# Patient Record
Sex: FEMALE | Race: WHITE | NOT HISPANIC OR LATINO | Employment: OTHER | URBAN - NONMETROPOLITAN AREA
[De-identification: names, ages, dates, MRNs, and addresses within clinical notes are randomized per-mention and may not be internally consistent; named-entity substitution may affect disease eponyms.]

---

## 2024-01-31 ENCOUNTER — APPOINTMENT (OUTPATIENT)
Dept: PRIMARY CARE | Facility: CLINIC | Age: 59
End: 2024-01-31
Payer: COMMERCIAL

## 2024-03-19 ENCOUNTER — APPOINTMENT (OUTPATIENT)
Dept: PRIMARY CARE | Facility: CLINIC | Age: 59
End: 2024-03-19
Payer: COMMERCIAL

## 2024-04-29 ENCOUNTER — TELEPHONE (OUTPATIENT)
Dept: PRIMARY CARE | Facility: CLINIC | Age: 59
End: 2024-04-29

## 2024-04-29 DIAGNOSIS — Z12.31 SCREENING MAMMOGRAM FOR BREAST CANCER: Primary | ICD-10-CM

## 2024-04-30 ENCOUNTER — APPOINTMENT (OUTPATIENT)
Dept: PRIMARY CARE | Facility: CLINIC | Age: 59
End: 2024-04-30
Payer: COMMERCIAL

## 2024-05-07 ENCOUNTER — HOSPITAL ENCOUNTER (OUTPATIENT)
Dept: RADIOLOGY | Facility: CLINIC | Age: 59
Discharge: HOME | End: 2024-05-07
Payer: COMMERCIAL

## 2024-05-07 VITALS — HEIGHT: 64 IN | WEIGHT: 146 LBS | BODY MASS INDEX: 24.92 KG/M2

## 2024-05-07 DIAGNOSIS — Z12.31 SCREENING MAMMOGRAM FOR BREAST CANCER: ICD-10-CM

## 2024-05-07 PROCEDURE — 77063 BREAST TOMOSYNTHESIS BI: CPT | Performed by: STUDENT IN AN ORGANIZED HEALTH CARE EDUCATION/TRAINING PROGRAM

## 2024-05-07 PROCEDURE — 77067 SCR MAMMO BI INCL CAD: CPT | Performed by: STUDENT IN AN ORGANIZED HEALTH CARE EDUCATION/TRAINING PROGRAM

## 2024-05-07 PROCEDURE — 77067 SCR MAMMO BI INCL CAD: CPT

## 2024-05-08 ENCOUNTER — APPOINTMENT (OUTPATIENT)
Dept: PRIMARY CARE | Facility: CLINIC | Age: 59
End: 2024-05-08
Payer: COMMERCIAL

## 2024-05-31 ENCOUNTER — APPOINTMENT (OUTPATIENT)
Dept: PRIMARY CARE | Facility: CLINIC | Age: 59
End: 2024-05-31
Payer: COMMERCIAL

## 2024-06-05 ENCOUNTER — HOSPITAL ENCOUNTER (OUTPATIENT)
Dept: RADIOLOGY | Facility: CLINIC | Age: 59
Discharge: HOME | End: 2024-06-05
Payer: COMMERCIAL

## 2024-06-05 DIAGNOSIS — M54.50 LOW BACK PAIN: ICD-10-CM

## 2024-06-05 PROCEDURE — 72040 X-RAY EXAM NECK SPINE 2-3 VW: CPT | Performed by: RADIOLOGY

## 2024-06-05 PROCEDURE — 72100 X-RAY EXAM L-S SPINE 2/3 VWS: CPT

## 2024-06-05 PROCEDURE — 72100 X-RAY EXAM L-S SPINE 2/3 VWS: CPT | Performed by: RADIOLOGY

## 2024-06-05 PROCEDURE — 72040 X-RAY EXAM NECK SPINE 2-3 VW: CPT

## 2024-06-14 ENCOUNTER — APPOINTMENT (OUTPATIENT)
Dept: PRIMARY CARE | Facility: CLINIC | Age: 59
End: 2024-06-14
Payer: COMMERCIAL

## 2024-08-20 ENCOUNTER — LAB (OUTPATIENT)
Dept: LAB | Facility: LAB | Age: 59
End: 2024-08-20
Payer: COMMERCIAL

## 2024-08-20 ENCOUNTER — APPOINTMENT (OUTPATIENT)
Dept: PRIMARY CARE | Facility: CLINIC | Age: 59
End: 2024-08-20
Payer: COMMERCIAL

## 2024-08-20 VITALS
HEART RATE: 70 BPM | BODY MASS INDEX: 25.51 KG/M2 | TEMPERATURE: 98.6 F | DIASTOLIC BLOOD PRESSURE: 65 MMHG | HEIGHT: 64 IN | WEIGHT: 149.4 LBS | SYSTOLIC BLOOD PRESSURE: 103 MMHG | RESPIRATION RATE: 12 BRPM | OXYGEN SATURATION: 97 %

## 2024-08-20 DIAGNOSIS — Z00.00 HEALTHCARE MAINTENANCE: ICD-10-CM

## 2024-08-20 DIAGNOSIS — R53.83 FATIGUE, UNSPECIFIED TYPE: ICD-10-CM

## 2024-08-20 DIAGNOSIS — R79.89 LOW VITAMIN D LEVEL: ICD-10-CM

## 2024-08-20 DIAGNOSIS — M54.50 LOW BACK PAIN WITHOUT SCIATICA, UNSPECIFIED BACK PAIN LATERALITY, UNSPECIFIED CHRONICITY: ICD-10-CM

## 2024-08-20 DIAGNOSIS — D72.819 LEUKOPENIA, UNSPECIFIED TYPE: ICD-10-CM

## 2024-08-20 DIAGNOSIS — E66.3 OVERWEIGHT WITH BODY MASS INDEX (BMI) OF 26 TO 26.9 IN ADULT: ICD-10-CM

## 2024-08-20 DIAGNOSIS — Z12.11 COLON CANCER SCREENING: ICD-10-CM

## 2024-08-20 DIAGNOSIS — Z00.00 HEALTHCARE MAINTENANCE: Primary | ICD-10-CM

## 2024-08-20 PROBLEM — M67.919 DISORDER OF ROTATOR CUFF: Status: RESOLVED | Noted: 2024-08-20 | Resolved: 2024-08-20

## 2024-08-20 PROBLEM — J38.01 VOCAL CORD PARALYSIS, UNILATERAL PARTIAL: Status: ACTIVE | Noted: 2024-08-20

## 2024-08-20 PROBLEM — M67.919 DISORDER OF ROTATOR CUFF: Status: ACTIVE | Noted: 2024-08-20

## 2024-08-20 PROBLEM — N83.202 OVARIAN CYST, LEFT: Status: ACTIVE | Noted: 2024-08-20

## 2024-08-20 PROCEDURE — 3008F BODY MASS INDEX DOCD: CPT | Performed by: FAMILY MEDICINE

## 2024-08-20 PROCEDURE — 80053 COMPREHEN METABOLIC PANEL: CPT

## 2024-08-20 PROCEDURE — 99203 OFFICE O/P NEW LOW 30 MIN: CPT | Performed by: FAMILY MEDICINE

## 2024-08-20 PROCEDURE — 84443 ASSAY THYROID STIM HORMONE: CPT

## 2024-08-20 PROCEDURE — 36415 COLL VENOUS BLD VENIPUNCTURE: CPT

## 2024-08-20 PROCEDURE — 82306 VITAMIN D 25 HYDROXY: CPT

## 2024-08-20 PROCEDURE — 1036F TOBACCO NON-USER: CPT | Performed by: FAMILY MEDICINE

## 2024-08-20 PROCEDURE — 80061 LIPID PANEL: CPT

## 2024-08-20 PROCEDURE — 85025 COMPLETE CBC W/AUTO DIFF WBC: CPT

## 2024-08-20 PROCEDURE — 99386 PREV VISIT NEW AGE 40-64: CPT | Performed by: FAMILY MEDICINE

## 2024-08-20 ASSESSMENT — PROMIS GLOBAL HEALTH SCALE
RATE_PHYSICAL_HEALTH: VERY GOOD
RATE_QUALITY_OF_LIFE: VERY GOOD
EMOTIONAL_PROBLEMS: RARELY
RATE_SOCIAL_SATISFACTION: VERY GOOD
CARRYOUT_SOCIAL_ACTIVITIES: VERY GOOD
CARRYOUT_PHYSICAL_ACTIVITIES: COMPLETELY
RATE_GENERAL_HEALTH: VERY GOOD
RATE_MENTAL_HEALTH: VERY GOOD
RATE_AVERAGE_PAIN: 1

## 2024-08-20 ASSESSMENT — PATIENT HEALTH QUESTIONNAIRE - PHQ9
SUM OF ALL RESPONSES TO PHQ9 QUESTIONS 1 AND 2: 0
1. LITTLE INTEREST OR PLEASURE IN DOING THINGS: NOT AT ALL
2. FEELING DOWN, DEPRESSED OR HOPELESS: NOT AT ALL

## 2024-08-20 NOTE — PATIENT INSTRUCTIONS
Leukopenia  Mild, no on prior CBC, repeat CBC w/ diff ordered today.    Healthcare maintenance  Vaccines and screenings reviewed.  Questionnaires completed.  Health and wellness topics reviewed.  Diet and exercise recommendations revisited.  Routine blood work ordered today.    VACCINES:  -TDAP is due, can get at pharmacy, no rx needed.  -Shingles vaccine (Shingrix) is recommended. Please call insurance to see if covered. This vaccine is expensive but extremely effective. This is to be administered in 2 separate doses, 2- 6 months apart. This is a killed virus vaccine, so no risk of chicken pox or shingles with administration. The vaccine is approximately 90 % effective to protect against shingles even 5 years out. Side effects of the vaccine can include soreness of the arm at administration site and possible flu-like symptoms after administration. This is a strongly recommended vaccine.     SCREENINGS:  -Screening pap is due, patient opts to come back to PCP for pap only visit  -Screening mammo is utd, last completed 5/2024, repeat in 1 year  -Screening for colon cancer is due. The 3 Colon Cancer screening tests were reviewed with patient, FIT Test, Colonoscopy and Cologuard. Risks and benefits of each test were discussed. Patient elects to undergo Cologuard, ordered today.    LIFESTYLE MEASURES  -consider increasing protein intake provided no issues with kidneys to 1 gram per 1 pound of ideal body weight per not to exceed 150 gram per day. May have to supplement with a protein powder to achieve this goal.  -make sure you are avoiding refined carbs such as breads, pasta, cereal, candy, soda,  nutrition bars, granola, chips, and sugar sweetened beverages.      -eat 5- 7 servings daily of veggies,  healthy protein such as chicken, fish,  beans, and eggs, and include healthy fats in your diet such as seeds, nuts, olive oil, avocados, and salmon.   -exercise 4 - 6 days per week as you are able, 150 minutes total weekly  divided up is recommended with 3-4 of those days including resistance/strength training.  -Vitamin D is recommended at 1000 - 5000 IU international units daily.   -Always wear sunscreen when you have sun exposure.  -64 oz of water is recommended daily.  -Dental visits recommended every 6 months.  -Eye exam recommended every 2 years, for those with vision problems every year.      Low vitamin D level  Noted on prior labs, repeat vitamin D level ordered today.    Low back pain without sciatica  S/p chiropractor, prior imaging reviewed, did show mild spondylosis.  Exam reassuring in office, no further imaging indicated at this time.    I recommend physical therapy. Order has been placed.     Doing low back and core muscle strengthening exercises and continuing them lifelong can decrease your risk of re-injury, as well as help with acute symptoms.      Patient may use heat or cold, whichever provides greater relief.    Patient can use ibuprofen or Aleve as instructed on the bottle, unless unable to take (allergy, recent bleeding in stomach, decreased kidney function)  . Make sure you take those after food, risk of stomach upset, GI bleeding, etc. increased if taken on an empty stomach.    Know that there is an extremely small but real risk of heart or stroke with use of these medicines, particularly if a person has cardiac risk factors such as high blood pressure, cholesterol, diabetes.      Tylenol can safely be used at doses up to 500 mg 2 tabs 3 times daily, unless liver function is decreased.      Recommend tiger balm over-the-counter as a topical soothing agent.      Biofreeze or other menthol based products can also be tried.      Try Epsom salt soaks to see if this improves pain and muscle tightness.      Follow up for pap only visit

## 2024-08-20 NOTE — ASSESSMENT & PLAN NOTE
Vaccines and screenings reviewed.  Questionnaires completed.  Health and wellness topics reviewed.  Diet and exercise recommendations revisited.  Routine blood work ordered today.    VACCINES:  -TDAP is due, can get at pharmacy, no rx needed.  -Shingles vaccine (Shingrix) is recommended. Please call insurance to see if covered. This vaccine is expensive but extremely effective. This is to be administered in 2 separate doses, 2- 6 months apart. This is a killed virus vaccine, so no risk of chicken pox or shingles with administration. The vaccine is approximately 90 % effective to protect against shingles even 5 years out. Side effects of the vaccine can include soreness of the arm at administration site and possible flu-like symptoms after administration. This is a strongly recommended vaccine.     SCREENINGS:  -Screening pap is due, patient opts to come back to PCP for pap only visit  -Screening mammo is utd, last completed 5/2024, repeat in 1 year  -Screening for colon cancer is due. The 3 Colon Cancer screening tests were reviewed with patient, FIT Test, Colonoscopy and Cologuard. Risks and benefits of each test were discussed. Patient elects to undergo Cologuard, ordered today.    LIFESTYLE MEASURES  -consider increasing protein intake provided no issues with kidneys to 1 gram per 1 pound of ideal body weight per not to exceed 150 gram per day. May have to supplement with a protein powder to achieve this goal.  -make sure you are avoiding refined carbs such as breads, pasta, cereal, candy, soda,  nutrition bars, granola, chips, and sugar sweetened beverages.      -eat 5- 7 servings daily of veggies,  healthy protein such as chicken, fish,  beans, and eggs, and include healthy fats in your diet such as seeds, nuts, olive oil, avocados, and salmon.   -exercise 4 - 6 days per week as you are able, 150 minutes total weekly divided up is recommended with 3-4 of those days including resistance/strength  training.  -Vitamin D is recommended at 1000 - 5000 IU international units daily.   -Always wear sunscreen when you have sun exposure.  -64 oz of water is recommended daily.  -Dental visits recommended every 6 months.  -Eye exam recommended every 2 years, for those with vision problems every year.

## 2024-08-20 NOTE — ASSESSMENT & PLAN NOTE
S/p chiropractor, prior imaging reviewed, did show mild spondylosis.  Exam reassuring in office, no further imaging indicated at this time.    I recommend physical therapy. Order has been placed.     Doing low back and core muscle strengthening exercises and continuing them lifelong can decrease your risk of re-injury, as well as help with acute symptoms.      Patient may use heat or cold, whichever provides greater relief.    Patient can use ibuprofen or Aleve as instructed on the bottle, unless unable to take (allergy, recent bleeding in stomach, decreased kidney function)  . Make sure you take those after food, risk of stomach upset, GI bleeding, etc. increased if taken on an empty stomach.    Know that there is an extremely small but real risk of heart or stroke with use of these medicines, particularly if a person has cardiac risk factors such as high blood pressure, cholesterol, diabetes.      Tylenol can safely be used at doses up to 500 mg 2 tabs 3 times daily, unless liver function is decreased.      Recommend tiger balm over-the-counter as a topical soothing agent.      Biofreeze or other menthol based products can also be tried.      Try Epsom salt soaks to see if this improves pain and muscle tightness.

## 2024-08-20 NOTE — PROGRESS NOTES
Subjective   Patient ID: Mable Daniel is a 59 y.o. female who presents for Annual Exam.    HPI     Patient presents today for annual physical.    Patient is previously known to this office,   Last saw Dr. Vazquez in 1/2020. Considered new patient since last visit greater than 3 years ago.    Patient concerns:  # Back pain/SI joint pain  Ongoing since May 2024  Saw chiropractor who performed x-rays, felt that hips were unaligned from possible prior fall and wonders if this was contributing to the back pain.    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 She is doing some stretching independently, not so much since recent flare. Has not undergone any PT.    6/2024 x-ray lumbar spine: multilevel spondylosis facet disease worse in the lower lumbar spine, minimal anterolisthesis L4 on L5    6/2024 x-ray cervical spine: mild spondylosis at C5-C6. No acute abnormality     No red flag symtpoms -  no unexplained weight loss, no neurologic symptoms      Trouble losing weight around the middle -  already lifting 4 - 5 days per week eating well,,  cycling macros - still unable,   I offered to set up appt to discuss medical assited wt loss but patient declined      Does have a bit of fatigue,    no family history of thyroid disease     History of vitamin D deficiency       WELLNESS VISIT   TDAP: none found  SHINGRIX: none found  PNEUMOVAX: n/a  PAP: none found  MAMMO: 5/2024  CSCOPE: 1/2020 cologuard negative - DUE  DEXA: n/a  HEP C SCREEN: none found  CACS: none found  LIPID: [id number]    Patient defers Shingrix vaccine.  Patient defers TDAP vaccine.    Diet/Exercise: states starting back on consistent program in May 2024, down 12-15 lbs with this effort. Continues with good efforts but still not seeing weight loss trends she would expect given the amount of effort.    Alcohol use: none  Smoking: never  "smoker    Cervical cancer screening: due  Denies family history in first degree relative.  Denies pelvic pain, vaginal discharge, or vaginal bleeding.    Breast cancer screening: utd  Denies family history in first degree relative.  Denies lumps/bumps, skin changes, nipple retraction, or nipple drainage.    Colon cancer screening: due?  Denies family history in first degree relative.  Denies melena, hematochezia, constipation, diarrhea, bloating, change in bowel habits.      Cardiac disorder screening:   Denies family history in first degree relative.  Denies chest pain, SOB, palpitations, edema, dizziness.       Review of Systems   All other systems reviewed and are negative.      Objective   /65 (BP Location: Left arm, Patient Position: Sitting, BP Cuff Size: Adult)   Pulse 70   Temp 37 °C (98.6 °F)   Resp 12   Ht 1.613 m (5' 3.5\")   Wt 67.8 kg (149 lb 6.4 oz)   SpO2 97%   BMI 26.05 kg/m²     Physical Exam  Vitals and nursing note reviewed.   Constitutional:       General: She is not in acute distress.     Appearance: Normal appearance. She is not toxic-appearing.   HENT:      Head: Normocephalic and atraumatic.   Eyes:      Extraocular Movements: Extraocular movements intact.      Pupils: Pupils are equal, round, and reactive to light.   Neck:      Thyroid: No thyromegaly.      Vascular: No hepatojugular reflux or JVD.   Cardiovascular:      Rate and Rhythm: Normal rate and regular rhythm.      Heart sounds: No murmur heard.     No friction rub. No gallop.   Pulmonary:      Effort: Pulmonary effort is normal.      Breath sounds: Normal breath sounds. No wheezing, rhonchi or rales.   Abdominal:      General: Bowel sounds are normal. There is no distension.      Palpations: Abdomen is soft. There is no mass.      Tenderness: There is no abdominal tenderness. There is no guarding.   Musculoskeletal:      Right lower leg: No edema.      Left lower leg: No edema.      Comments: Mildly ttp paraspinal " muscles, R>L  SI height discrepancy, superior on left, inferior on right   Lymphadenopathy:      Cervical: No cervical adenopathy.   Skin:     General: Skin is warm and dry.   Neurological:      General: No focal deficit present.      Mental Status: She is alert and oriented to person, place, and time.      Gait: Gait normal.   Psychiatric:         Mood and Affect: Mood normal.         Behavior: Behavior normal.         Assessment/Plan   Problem List Items Addressed This Visit             ICD-10-CM    Healthcare maintenance - Primary Z00.00     Vaccines and screenings reviewed.  Questionnaires completed.  Health and wellness topics reviewed.  Diet and exercise recommendations revisited.  Routine blood work ordered today.    VACCINES:  -TDAP is due, can get at pharmacy, no rx needed.  -Shingles vaccine (Shingrix) is recommended. Please call insurance to see if covered. This vaccine is expensive but extremely effective. This is to be administered in 2 separate doses, 2- 6 months apart. This is a killed virus vaccine, so no risk of chicken pox or shingles with administration. The vaccine is approximately 90 % effective to protect against shingles even 5 years out. Side effects of the vaccine can include soreness of the arm at administration site and possible flu-like symptoms after administration. This is a strongly recommended vaccine.     SCREENINGS:  -Screening pap is due, patient opts to come back to PCP for pap only visit  -Screening mammo is utd, last completed 5/2024, repeat in 1 year  -Screening for colon cancer is due. The 3 Colon Cancer screening tests were reviewed with patient, FIT Test, Colonoscopy and Cologuard. Risks and benefits of each test were discussed. Patient elects to undergo Cologuard, ordered today.    LIFESTYLE MEASURES  -consider increasing protein intake provided no issues with kidneys to 1 gram per 1 pound of ideal body weight per not to exceed 150 gram per day. May have to supplement with  a protein powder to achieve this goal.  -make sure you are avoiding refined carbs such as breads, pasta, cereal, candy, soda,  nutrition bars, granola, chips, and sugar sweetened beverages.      -eat 5- 7 servings daily of veggies,  healthy protein such as chicken, fish,  beans, and eggs, and include healthy fats in your diet such as seeds, nuts, olive oil, avocados, and salmon.   -exercise 4 - 6 days per week as you are able, 150 minutes total weekly divided up is recommended with 3-4 of those days including resistance/strength training.  -Vitamin D is recommended at 1000 - 5000 IU international units daily.   -Always wear sunscreen when you have sun exposure.  -64 oz of water is recommended daily.  -Dental visits recommended every 6 months.  -Eye exam recommended every 2 years, for those with vision problems every year.           Relevant Orders    Comprehensive Metabolic Panel    CBC and Auto Differential    Lipid Panel    Leukopenia D72.819     Mild, no on prior CBC, repeat CBC w/ diff ordered today.         Low vitamin D level R79.89     Noted on prior labs, repeat vitamin D level ordered today.         Relevant Orders    Vitamin D 25-Hydroxy,Total (for eval of Vitamin D levels)    Low back pain without sciatica M54.50     S/p chiropractor, prior imaging reviewed, did show mild spondylosis.  Exam reassuring in office, no further imaging indicated at this time.    I recommend physical therapy. Order has been placed.     Doing low back and core muscle strengthening exercises and continuing them lifelong can decrease your risk of re-injury, as well as help with acute symptoms.      Patient may use heat or cold, whichever provides greater relief.    Patient can use ibuprofen or Aleve as instructed on the bottle, unless unable to take (allergy, recent bleeding in stomach, decreased kidney function)  . Make sure you take those after food, risk of stomach upset, GI bleeding, etc. increased if taken on an empty  stomach.    Know that there is an extremely small but real risk of heart or stroke with use of these medicines, particularly if a person has cardiac risk factors such as high blood pressure, cholesterol, diabetes.      Tylenol can safely be used at doses up to 500 mg 2 tabs 3 times daily, unless liver function is decreased.      Recommend tiger balm over-the-counter as a topical soothing agent.      Biofreeze or other menthol based products can also be tried.      Try Epsom salt soaks to see if this improves pain and muscle tightness.          Relevant Orders    Referral to Physical Therapy     Other Visit Diagnoses         Codes    Overweight with body mass index (BMI) of 26 to 26.9 in adult     E66.3, Z68.26    Colon cancer screening     Z12.11    Relevant Orders    Cologuard® colon cancer screening    Fatigue, unspecified type     R53.83    Relevant Orders    TSH with reflex to Free T4 if abnormal            Follow-up in earliest convenience for pap only visit.  Call for sooner follow-up if needed.         Scribe Attestation  By signing my name below, ICamila Scribe   attest that this documentation has been prepared under the direction and in the presence of Shana Villarreal DO.

## 2024-08-21 LAB
25(OH)D3 SERPL-MCNC: 35 NG/ML (ref 30–100)
ALBUMIN SERPL BCP-MCNC: 4.6 G/DL (ref 3.4–5)
ALP SERPL-CCNC: 75 U/L (ref 33–110)
ALT SERPL W P-5'-P-CCNC: 20 U/L (ref 7–45)
ANION GAP SERPL CALC-SCNC: 15 MMOL/L (ref 10–20)
AST SERPL W P-5'-P-CCNC: 24 U/L (ref 9–39)
BASOPHILS # BLD AUTO: 0.02 X10*3/UL (ref 0–0.1)
BASOPHILS NFR BLD AUTO: 0.7 %
BILIRUB SERPL-MCNC: 0.4 MG/DL (ref 0–1.2)
BUN SERPL-MCNC: 17 MG/DL (ref 6–23)
CALCIUM SERPL-MCNC: 9.6 MG/DL (ref 8.6–10.6)
CHLORIDE SERPL-SCNC: 101 MMOL/L (ref 98–107)
CHOLEST SERPL-MCNC: 240 MG/DL (ref 0–199)
CHOLESTEROL/HDL RATIO: 2.8
CO2 SERPL-SCNC: 30 MMOL/L (ref 21–32)
CREAT SERPL-MCNC: 0.72 MG/DL (ref 0.5–1.05)
EGFRCR SERPLBLD CKD-EPI 2021: >90 ML/MIN/1.73M*2
EOSINOPHIL # BLD AUTO: 0.02 X10*3/UL (ref 0–0.7)
EOSINOPHIL NFR BLD AUTO: 0.7 %
ERYTHROCYTE [DISTWIDTH] IN BLOOD BY AUTOMATED COUNT: 13.1 % (ref 11.5–14.5)
GLUCOSE SERPL-MCNC: 86 MG/DL (ref 74–99)
HCT VFR BLD AUTO: 41.8 % (ref 36–46)
HDLC SERPL-MCNC: 86.1 MG/DL
HGB BLD-MCNC: 13.4 G/DL (ref 12–16)
IMM GRANULOCYTES # BLD AUTO: 0.01 X10*3/UL (ref 0–0.7)
IMM GRANULOCYTES NFR BLD AUTO: 0.3 % (ref 0–0.9)
LDLC SERPL CALC-MCNC: 136 MG/DL
LYMPHOCYTES # BLD AUTO: 0.87 X10*3/UL (ref 1.2–4.8)
LYMPHOCYTES NFR BLD AUTO: 29.6 %
MCH RBC QN AUTO: 28 PG (ref 26–34)
MCHC RBC AUTO-ENTMCNC: 32.1 G/DL (ref 32–36)
MCV RBC AUTO: 87 FL (ref 80–100)
MONOCYTES # BLD AUTO: 0.23 X10*3/UL (ref 0.1–1)
MONOCYTES NFR BLD AUTO: 7.8 %
NEUTROPHILS # BLD AUTO: 1.79 X10*3/UL (ref 1.2–7.7)
NEUTROPHILS NFR BLD AUTO: 60.9 %
NON HDL CHOLESTEROL: 154 MG/DL (ref 0–149)
NRBC BLD-RTO: 0 /100 WBCS (ref 0–0)
PLATELET # BLD AUTO: 195 X10*3/UL (ref 150–450)
POTASSIUM SERPL-SCNC: 4.6 MMOL/L (ref 3.5–5.3)
PROT SERPL-MCNC: 7.2 G/DL (ref 6.4–8.2)
RBC # BLD AUTO: 4.78 X10*6/UL (ref 4–5.2)
SODIUM SERPL-SCNC: 141 MMOL/L (ref 136–145)
TRIGL SERPL-MCNC: 88 MG/DL (ref 0–149)
TSH SERPL-ACNC: 2.38 MIU/L (ref 0.44–3.98)
VLDL: 18 MG/DL (ref 0–40)
WBC # BLD AUTO: 2.9 X10*3/UL (ref 4.4–11.3)

## 2024-08-26 DIAGNOSIS — D72.819 LEUKOPENIA, UNSPECIFIED TYPE: Primary | ICD-10-CM

## 2024-08-28 ENCOUNTER — APPOINTMENT (OUTPATIENT)
Dept: PHYSICAL THERAPY | Facility: CLINIC | Age: 59
End: 2024-08-28
Payer: COMMERCIAL

## 2024-09-11 ENCOUNTER — PATIENT OUTREACH (OUTPATIENT)
Dept: HEMATOLOGY/ONCOLOGY | Facility: HOSPITAL | Age: 59
End: 2024-09-11
Payer: COMMERCIAL

## 2024-09-11 NOTE — PROGRESS NOTES
9/11/24 1420  Received referral for this patient from her PCP. Patient was found to have leukocytosis with WBC 3.1 three years ago and 2.9 this year. Patient is referred to [facility] for work up. I have called patient to confirm appointment details and left my  contact info for further concerns. Patient is scheduled for 9/17/24 with Dr. Murry. Elly, APARNA

## 2024-09-12 ENCOUNTER — APPOINTMENT (OUTPATIENT)
Dept: PHYSICAL THERAPY | Facility: CLINIC | Age: 59
End: 2024-09-12
Payer: COMMERCIAL

## 2024-09-12 LAB — NONINV COLON CA DNA+OCC BLD SCRN STL QL: NEGATIVE

## 2024-09-17 ENCOUNTER — OFFICE VISIT (OUTPATIENT)
Dept: HEMATOLOGY/ONCOLOGY | Facility: CLINIC | Age: 59
End: 2024-09-17
Payer: COMMERCIAL

## 2024-09-17 ENCOUNTER — LAB (OUTPATIENT)
Dept: LAB | Facility: CLINIC | Age: 59
End: 2024-09-17
Payer: COMMERCIAL

## 2024-09-17 VITALS
BODY MASS INDEX: 25.93 KG/M2 | SYSTOLIC BLOOD PRESSURE: 118 MMHG | OXYGEN SATURATION: 98 % | TEMPERATURE: 98.4 F | HEART RATE: 69 BPM | RESPIRATION RATE: 12 BRPM | DIASTOLIC BLOOD PRESSURE: 75 MMHG | WEIGHT: 148.7 LBS

## 2024-09-17 DIAGNOSIS — D72.819 LEUKOPENIA, UNSPECIFIED TYPE: ICD-10-CM

## 2024-09-17 LAB
BASOPHILS # BLD AUTO: 0.02 X10*3/UL (ref 0–0.1)
BASOPHILS NFR BLD AUTO: 0.5 %
EOSINOPHIL # BLD AUTO: 0.04 X10*3/UL (ref 0–0.7)
EOSINOPHIL NFR BLD AUTO: 1 %
ERYTHROCYTE [DISTWIDTH] IN BLOOD BY AUTOMATED COUNT: 13 % (ref 11.5–14.5)
HCT VFR BLD AUTO: 39.5 % (ref 36–46)
HGB BLD-MCNC: 13.2 G/DL (ref 12–16)
IMM GRANULOCYTES # BLD AUTO: 0 X10*3/UL (ref 0–0.7)
IMM GRANULOCYTES NFR BLD AUTO: 0 % (ref 0–0.9)
LYMPHOCYTES # BLD AUTO: 0.92 X10*3/UL (ref 1.2–4.8)
LYMPHOCYTES NFR BLD AUTO: 23.5 %
MCH RBC QN AUTO: 29.1 PG (ref 26–34)
MCHC RBC AUTO-ENTMCNC: 33.4 G/DL (ref 32–36)
MCV RBC AUTO: 87 FL (ref 80–100)
MONOCYTES # BLD AUTO: 0.33 X10*3/UL (ref 0.1–1)
MONOCYTES NFR BLD AUTO: 8.4 %
NEUTROPHILS # BLD AUTO: 2.6 X10*3/UL (ref 1.2–7.7)
NEUTROPHILS NFR BLD AUTO: 66.6 %
NRBC BLD-RTO: ABNORMAL /100{WBCS}
PLATELET # BLD AUTO: 185 X10*3/UL (ref 150–450)
RBC # BLD AUTO: 4.54 X10*6/UL (ref 4–5.2)
WBC # BLD AUTO: 3.9 X10*3/UL (ref 4.4–11.3)

## 2024-09-17 PROCEDURE — 83516 IMMUNOASSAY NONANTIBODY: CPT

## 2024-09-17 PROCEDURE — 86706 HEP B SURFACE ANTIBODY: CPT

## 2024-09-17 PROCEDURE — 86803 HEPATITIS C AB TEST: CPT

## 2024-09-17 PROCEDURE — 99205 OFFICE O/P NEW HI 60 MIN: CPT | Performed by: STUDENT IN AN ORGANIZED HEALTH CARE EDUCATION/TRAINING PROGRAM

## 2024-09-17 PROCEDURE — 99215 OFFICE O/P EST HI 40 MIN: CPT | Performed by: STUDENT IN AN ORGANIZED HEALTH CARE EDUCATION/TRAINING PROGRAM

## 2024-09-17 PROCEDURE — 85025 COMPLETE CBC W/AUTO DIFF WBC: CPT

## 2024-09-17 PROCEDURE — 87340 HEPATITIS B SURFACE AG IA: CPT

## 2024-09-17 PROCEDURE — 36415 COLL VENOUS BLD VENIPUNCTURE: CPT

## 2024-09-17 PROCEDURE — 82784 ASSAY IGA/IGD/IGG/IGM EACH: CPT

## 2024-09-17 PROCEDURE — 88184 FLOWCYTOMETRY/ TC 1 MARKER: CPT | Mod: TC

## 2024-09-17 PROCEDURE — 86431 RHEUMATOID FACTOR QUANT: CPT

## 2024-09-17 PROCEDURE — 86704 HEP B CORE ANTIBODY TOTAL: CPT

## 2024-09-17 PROCEDURE — 84165 PROTEIN E-PHORESIS SERUM: CPT

## 2024-09-17 PROCEDURE — 87389 HIV-1 AG W/HIV-1&-2 AB AG IA: CPT

## 2024-09-17 PROCEDURE — 84155 ASSAY OF PROTEIN SERUM: CPT

## 2024-09-17 PROCEDURE — 82607 VITAMIN B-12: CPT

## 2024-09-17 PROCEDURE — 86038 ANTINUCLEAR ANTIBODIES: CPT

## 2024-09-17 PROCEDURE — 83521 IG LIGHT CHAINS FREE EACH: CPT

## 2024-09-17 PROCEDURE — 80053 COMPREHEN METABOLIC PANEL: CPT

## 2024-09-17 ASSESSMENT — ENCOUNTER SYMPTOMS
HEMATOLOGIC/LYMPHATIC NEGATIVE: 1
CARDIOVASCULAR NEGATIVE: 1
PSYCHIATRIC NEGATIVE: 1
EYES NEGATIVE: 1
FATIGUE: 1
NEUROLOGICAL NEGATIVE: 1
MUSCULOSKELETAL NEGATIVE: 1
ENDOCRINE NEGATIVE: 1
RESPIRATORY NEGATIVE: 1
GASTROINTESTINAL NEGATIVE: 1

## 2024-09-17 ASSESSMENT — PAIN SCALES - GENERAL: PAINLEVEL: 0-NO PAIN

## 2024-09-17 NOTE — PROGRESS NOTES
"Patient ID: Mable Daniel is a 59 y.o. female.  Referring Physician: Shana Villarreal DO  [address]  [facility]  [place] [address]  Primary Care Provider: Shana Villarreal DO      Arnoldo Dinero presents today for further evaluation of mild lymphocytopenia (ALC 1.1 on 2017, 1.13 on 2020, 0.87 on 2024).    She is not aware of any prior cytopenias.  She is feeling in her usual state of health today without any acute symptoms.  She thinks she might be a little more fatigued this year compared to last year but she has also been busier with grandchildren.  She feels good when she wakes up but feels like she'd like to take a short nap after lunch.  She works out 6 days per week and walks 4-6 days per week.  Walking 1.5-2 miles feels easy but longer distances are harder (though she pushes herself to continue).    She sometimes feels tenderness in her cervical lymph nodes; it has happened before after exposure to infections, but seems a little more frequent in the last 4-5 months.      She changed her diet to be \"\" in 2016 and lost 60 lb intentionally.      She has had one infection since .      She herself has not had any rashes, joint erythema/warmth/tenderness (except for some knuckle swelling attributed to OA)  but had an JOELLEN drawn due to a family history of autoimmune disease (see below).  It was reportedly negative a few years ago.    No fevers, chills, drenching night sweats, palpable LAD, anorexia, and unintentional weight loss.    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   HENT:  Negative.     Eyes: Negative.    Respiratory: Negative.     Cardiovascular: Negative.    Gastrointestinal: Negative.    Endocrine: Negative.    Genitourinary: Negative.     Musculoskeletal: Negative.    Skin: Negative.    Neurological: Negative.    Hematological: Negative.    Psychiatric/Behavioral: Negative.          Objective   BSA: 1.74 meters squared  /75   Pulse 69   Temp 36.9 °C (98.4 °F)   Resp 12   Wt 67.4 kg (148 lb 11.2 oz)   SpO2 98%   BMI 25.93 kg/m²       Physical Exam  Vitals reviewed.   HENT:      Head: Normocephalic and atraumatic.      Right Ear: External ear normal.      Left Ear: External ear normal.      Nose: Nose normal.      Mouth/Throat:      Mouth: Mucous membranes are moist.      Pharynx: Oropharynx is clear.   Eyes:      Extraocular Movements: Extraocular movements intact.      Conjunctiva/sclera: Conjunctivae normal.      Pupils: Pupils are equal, round, and reactive to light.   Cardiovascular:      Rate and Rhythm: Normal rate and regular rhythm.   Pulmonary:      Effort: Pulmonary effort is normal.      Breath sounds: Normal breath sounds.   Abdominal:      Palpations: Abdomen is soft.      Tenderness: There is no abdominal tenderness.   Musculoskeletal:         General: Normal range of motion.      Cervical back: Normal range of motion.   Skin:     General: Skin is warm and dry.   Neurological:      General: No focal deficit present.      Mental Status: She is alert.   Psychiatric:         Mood and Affect: Mood normal.         Behavior: Behavior normal.         Thought Content: Thought content normal.       Performance Status:  Asymptomatic    Results from last 7 days   Lab Units 09/17/24  1533   WBC AUTO x10*3/uL 3.9*   HEMOGLOBIN g/dL 13.2   HEMATOCRIT % 39.5   PLATELETS  AUTO x10*3/uL 185   NEUTROS PCT AUTO % 66.6   LYMPHS PCT AUTO % 23.5   MONOS PCT AUTO % 8.4   EOS PCT AUTO % 1.0       Assessment/Plan      Mild cytopenia, progressing very slightly over the last several years.  Ddx includes infectious, autoimmune, immunodeficient, nutritional, malignant processes.  We reviewed diagnostic possibilities and will obtain Hep B/C/HIV, SPEP, light chains, IgGAM, PB flow, JOELLEN, ANCA, RF, B12.    We will arrange a phone visit to discuss results, and whether any further testing is needed.    Natalee Murry MD PhD

## 2024-09-17 NOTE — PROGRESS NOTES
NPV completed. POC reviewed with pt who verbalizes understanding via teach back method and is agreeable with this plan. Pt to return after 1:00 today for lab work and FU as scheduled for virtual appointment visit 9/26 with Dr Murry.

## 2024-09-18 LAB
ALBUMIN SERPL BCP-MCNC: 4.6 G/DL (ref 3.4–5)
ALP SERPL-CCNC: 70 U/L (ref 33–110)
ALT SERPL W P-5'-P-CCNC: 19 U/L (ref 7–45)
ANA SER QL HEP2 SUBST: NEGATIVE
ANION GAP SERPL CALC-SCNC: 12 MMOL/L (ref 10–20)
AST SERPL W P-5'-P-CCNC: 27 U/L (ref 9–39)
BILIRUB SERPL-MCNC: 0.4 MG/DL (ref 0–1.2)
BUN SERPL-MCNC: 26 MG/DL (ref 6–23)
CALCIUM SERPL-MCNC: 9.2 MG/DL (ref 8.6–10.6)
CHLORIDE SERPL-SCNC: 103 MMOL/L (ref 98–107)
CO2 SERPL-SCNC: 28 MMOL/L (ref 21–32)
CREAT SERPL-MCNC: 0.8 MG/DL (ref 0.5–1.05)
EGFRCR SERPLBLD CKD-EPI 2021: 86 ML/MIN/1.73M*2
GLUCOSE SERPL-MCNC: 86 MG/DL (ref 74–99)
HBV CORE AB SER QL: NONREACTIVE
HBV SURFACE AB SER-ACNC: 176.3 MIU/ML
HBV SURFACE AG SERPL QL IA: NONREACTIVE
HCV AB SER QL: NONREACTIVE
HIV 1+2 AB+HIV1 P24 AG SERPL QL IA: NONREACTIVE
IGA SERPL-MCNC: 148 MG/DL (ref 70–400)
IGG SERPL-MCNC: 1130 MG/DL (ref 700–1600)
IGM SERPL-MCNC: 82 MG/DL (ref 40–230)
KAPPA LC SERPL-MCNC: 1.53 MG/DL (ref 0.33–1.94)
KAPPA LC/LAMBDA SER: 1.47 {RATIO} (ref 0.26–1.65)
LAMBDA LC SERPL-MCNC: 1.04 MG/DL (ref 0.57–2.63)
POTASSIUM SERPL-SCNC: 4.1 MMOL/L (ref 3.5–5.3)
PROT SERPL-MCNC: 6.9 G/DL (ref 6.4–8.2)
PROT SERPL-MCNC: 6.9 G/DL (ref 6.4–8.2)
RHEUMATOID FACT SER NEPH-ACNC: <10 IU/ML (ref 0–15)
SODIUM SERPL-SCNC: 139 MMOL/L (ref 136–145)
VIT B12 SERPL-MCNC: 404 PG/ML (ref 211–911)

## 2024-09-20 LAB
ALBUMIN: 4.4 G/DL (ref 3.4–5)
ALPHA 1 GLOBULIN: 0.3 G/DL (ref 0.2–0.6)
ALPHA 2 GLOBULIN: 0.5 G/DL (ref 0.4–1.1)
ANCA AB PATTERN SER IF-IMP: NORMAL
ANCA IGG TITR SER IF: NORMAL {TITER}
BETA GLOBULIN: 0.7 G/DL (ref 0.5–1.2)
CELL COUNT (BLOOD): 3.9 X10*3/UL
CELL POPULATIONS: NORMAL
DIAGNOSIS: NORMAL
FLOW DIFFERENTIAL: NORMAL
FLOW TEST ORDERED: NORMAL
GAMMA GLOBULIN: 1 G/DL (ref 0.5–1.4)
LAB TEST METHOD: NORMAL
MYELOPEROXIDASE AB SER-ACNC: 0 AU/ML (ref 0–19)
NUMBER OF CELLS COLLECTED: NORMAL PER TUBE
PATH REPORT.TOTAL CANCER: NORMAL
PATH REVIEW-SERUM PROTEIN ELECTROPHORESIS: NORMAL
PROTEIN ELECTROPHORESIS COMMENT: NORMAL
PROTEINASE3 AB SER-ACNC: 0 AU/ML (ref 0–19)
SIGNATURE COMMENT: NORMAL
SPECIMEN VIABILITY: NORMAL

## 2024-09-24 ENCOUNTER — APPOINTMENT (OUTPATIENT)
Dept: PHYSICAL THERAPY | Facility: CLINIC | Age: 59
End: 2024-09-24
Payer: COMMERCIAL

## 2024-09-26 ENCOUNTER — TELEPHONE (OUTPATIENT)
Dept: HEMATOLOGY/ONCOLOGY | Facility: HOSPITAL | Age: 59
End: 2024-09-26
Payer: COMMERCIAL

## 2024-09-26 ENCOUNTER — TELEMEDICINE (OUTPATIENT)
Dept: HEMATOLOGY/ONCOLOGY | Facility: HOSPITAL | Age: 59
End: 2024-09-26
Payer: COMMERCIAL

## 2024-09-26 DIAGNOSIS — D72.819 LEUKOPENIA, UNSPECIFIED TYPE: ICD-10-CM

## 2024-09-26 NOTE — TELEPHONE ENCOUNTER
----- Message from Natalee Murry sent at 9/26/2024  9:03 AM EDT -----  I set up a phone visit with this patient for 4:30 to review labs.  The problem is  her labs are all normal and I'm not sure where to go from here.  Would you mind letting the patient know that so far all of her labs look normal and I would like to review her case with my colleagues to make a plan moving forward?  I just don't want to waste a visit without having useful information to share.    I could call her next week; would you mind asking her when would be a good day to call in the afternoon?

## 2024-09-26 NOTE — TELEPHONE ENCOUNTER
RN called and spoke to the patient and relayed Dr. Murry's message. She said she could do a call on Friday afternoon. MD Murry approved.

## 2024-10-04 ENCOUNTER — TELEMEDICINE (OUTPATIENT)
Dept: HEMATOLOGY/ONCOLOGY | Facility: HOSPITAL | Age: 59
End: 2024-10-04
Payer: COMMERCIAL

## 2024-10-04 DIAGNOSIS — D72.819 LEUKOPENIA, UNSPECIFIED TYPE: ICD-10-CM

## 2024-10-04 PROCEDURE — 99215 OFFICE O/P EST HI 40 MIN: CPT | Performed by: STUDENT IN AN ORGANIZED HEALTH CARE EDUCATION/TRAINING PROGRAM

## 2024-10-07 ASSESSMENT — ENCOUNTER SYMPTOMS
CARDIOVASCULAR NEGATIVE: 1
ENDOCRINE NEGATIVE: 1
GASTROINTESTINAL NEGATIVE: 1
FATIGUE: 1
MUSCULOSKELETAL NEGATIVE: 1
EYES NEGATIVE: 1
PSYCHIATRIC NEGATIVE: 1
HEMATOLOGIC/LYMPHATIC NEGATIVE: 1
RESPIRATORY NEGATIVE: 1
NEUROLOGICAL NEGATIVE: 1

## 2024-10-07 NOTE — PROGRESS NOTES
Patient ID: Mable Daniel is a 59 y.o. female.  Referring Physician: No referring provider defined for this encounter.  Primary Care Provider: DO Arnoldo Mota    Rosette presents today by phone for discussion of workup performed for mild lymphocytopenia (ALC 1.1 on 2017, 1.13 on 2020, 0.87 on 2024).    She has no new symptoms but continues to feel intermittent tenderness in her cervical lymph nodes; it has happened before after exposure to infections, but seems a little more frequent in the last 4-5 months.      No fevers, chills, drenching night sweats, palpable LAD, anorexia, and unintentional weight loss.    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   HENT:  Negative.     Eyes: Negative.    Respiratory: Negative.     Cardiovascular: Negative.    Gastrointestinal: Negative.    Endocrine: Negative.    Genitourinary: Negative.     Musculoskeletal: Negative.    Skin: Negative.    Neurological: Negative.    Hematological: Negative.    Psychiatric/Behavioral: Negative.          Objective   BSA: There is no height or weight on file to calculate BSA.  There were no vitals taken for this visit.      Physical Exam  Pulmonary:      Effort: No respiratory distress.      Breath sounds: Normal breath sounds. No stridor. No wheezing.   Neurological:      Mental Status: She is alert.   Psychiatric:         Mood and Affect: Mood normal.          Thought Content: Thought content normal.         Judgment: Judgment normal.       Performance Status:  Asymptomatic    Labs:    CBCs:  9/17/24:  WBC 3.9, Hgb 13.2, plts 185, ANC 2.60, ALC 0.92  8/20/24:  WBC 2.9, Hgb 13.4, plts 195, ANC 1.79, ALC 0.87  12/7/20:  WBC 3.1, Hgb 12.4, plts 174, ANC 1.66, ALC 1.13  12/6/17:  WBC 5.1, Hgb 12.5, plts 219, ANC 3.62, ALC 1.10    Leukopenia workup:  9/17/24    JOELLEN, ANCA, RF neg  SPEP normal, light chains normal  IgG, IgA, IgM normal  Hep B c/w vaccination  Hep C neg  HIV neg  PB flow:  low  expression on granulocytes; no evidence of lymphoproliferative disorder  B12 nl (404)            Assessment/Plan      We reviewed all lab results so far.  There is no evidence of autoimmune disease, infection, immunodeficiency, or lymphoproliferative disorder.  Could consider bone marrow biopsy to definitively rule out dysplasia as a cause of low  expression on granulocytes, but without cytopenias it is unlikely that intervention would be indicated.      I will refer her to immunology in case the lymphocytopenia is somehow related to an immunodeficiency (subclinical) that I did not test for.  If no dx is made, would reconsider BMBx.    Pt will follow up in 4m to allow time for immunology evaluation.    Natalee Murry MD PhD

## 2024-12-11 ENCOUNTER — APPOINTMENT (OUTPATIENT)
Dept: PRIMARY CARE | Facility: CLINIC | Age: 59
End: 2024-12-11
Payer: COMMERCIAL

## 2025-01-29 ENCOUNTER — APPOINTMENT (OUTPATIENT)
Dept: ALLERGY | Facility: CLINIC | Age: 60
End: 2025-01-29
Payer: COMMERCIAL

## 2025-04-30 ENCOUNTER — APPOINTMENT (OUTPATIENT)
Dept: ALLERGY | Facility: CLINIC | Age: 60
End: 2025-04-30
Payer: COMMERCIAL

## 2025-04-30 VITALS
BODY MASS INDEX: 25.37 KG/M2 | WEIGHT: 148.59 LBS | SYSTOLIC BLOOD PRESSURE: 121 MMHG | DIASTOLIC BLOOD PRESSURE: 82 MMHG | HEART RATE: 71 BPM | HEIGHT: 64 IN

## 2025-04-30 DIAGNOSIS — D72.819 LEUKOPENIA, UNSPECIFIED TYPE: ICD-10-CM

## 2025-04-30 PROCEDURE — 3008F BODY MASS INDEX DOCD: CPT | Performed by: ALLERGY & IMMUNOLOGY

## 2025-04-30 PROCEDURE — 99204 OFFICE O/P NEW MOD 45 MIN: CPT | Performed by: ALLERGY & IMMUNOLOGY

## 2025-04-30 ASSESSMENT — ENCOUNTER SYMPTOMS
EYES NEGATIVE: 1
MUSCULOSKELETAL NEGATIVE: 1
ALLERGIC/IMMUNOLOGIC NEGATIVE: 1
CONSTITUTIONAL NEGATIVE: 1
RESPIRATORY NEGATIVE: 1
GASTROINTESTINAL NEGATIVE: 1
CARDIOVASCULAR NEGATIVE: 1
HEMATOLOGIC/LYMPHATIC NEGATIVE: 1

## 2025-04-30 NOTE — PROGRESS NOTES
Mable Daniel presents for initial evaluation today.      Mable Daniel was seen at the request of Natalee Murry MD P* for a chief complaint of concern for immune deficiency; a report with my findings is being sent via written or electronic means to Natalee Murry MD P* with my recommendations for treatment    She provides the following history:  She was referred from Dr. Murry in Hematology/Oncology for evaluation of possible immune deficiency contributing to for mild leukopenia and lymphopenia.  Regarding evaluation, she has had negative autoimmune antibody testing, normal quantitative immunoglobulins, normal infectious disease screening including for hepatitis and HIV, and peripheral blood lymphocyte immunophenotyping which showed low  expression on granulocytes.      In terms of infections, she denies history of recurrent infections including recurrent sinopulmonary infections, fungal infections, skin infections, abscesses, boils, history of sepsis, history of meningitis, history of any severe or recalcitrant infections.  She denies any family history of immune deficiency.     She notes that when she initially saw hematology, she had fatigue and cervical lymphadenopathy that has since resolved.  She also has a separate concern about history of recurrent CVAs in mother and grandmother and whether she has risk of CVA in the future.    Rhinitis: Denies     Asthma: Denies     Eczema: Denies eczema however was recently diagnosed with contact dermatitis.  She was seen by Dr. Willie Smith in allergy and had patch testing that was positive to formaldehyde and MCI/MI yesterday.      Food allergy: Denies     Venom allergy:  Denies     Drug allergy: Meperidine causes itching during childbirth       Pertinent Allergy/Immunology family history:  Daughter with UCTD, contact dermatitis to coconut derivate's   Mother had breast cancer and had pulmonary issue, CVA; unsure if had autoimmunity   Denies  "family history of immune deficiency/inborn errors of immunity      Review of Systems   Constitutional: Negative.    HENT: Negative.     Eyes: Negative.    Respiratory: Negative.     Cardiovascular: Negative.    Gastrointestinal: Negative.    Musculoskeletal: Negative.    Skin: Negative.    Allergic/Immunologic: Negative.    Hematological: Negative.      Vital signs:  /82 (BP Location: Right arm, Patient Position: Sitting)   Pulse 71   Ht 1.613 m (5' 3.5\")   Wt 67.4 kg (148 lb 9.4 oz)   BMI 25.91 kg/m²     Physical Exam:  GENERAL: Alert, oriented and in no acute distress.     HEENT: EYES: No conjunctival injection or cobblestoning. Nose: nasal turbinates mildly edematous and are not boggy.  There is no mucous stranding, polyps, or blood noted. EARS: Tympanic membranes are clear. MOUTH: moist and pink with no exudates, ulcers, or thrush. NECK: No upper airway stridor noted.       HEART: regular rate and rhythm.       LUNGS: Clear to auscultation bilaterally. No wheezing, rhonchi or rales.        ABDOMEN: Positive bowel sounds, soft, nontender, nondistended.       EXTREMITIES: No clubbing or edema.        NEURO:  Normal affect.  Gait normal.      SKIN: No rash, hives, or angioedema noted    Impression:  1. Leukopenia, unspecified type      Assessment and Plan:  We reviewed labs in detail today including qualitative immunoglobulins, autoimmune screening, infectious disease screening, and peripheral blood immunophenotyping.  She has normal quantitative IgG, IgA, IgM, as well as unremarkable B, T, NK cell populations.  The only abnormality detected was low expression of  on granulocytes.  Based on clinical history and laboratory evaluation, immune deficiency is unlikely.  Would recommend continued evaluation for possible myelodysplastic disorder particularly given low expression of .  She will contact hematology/oncology to determine next steps.    She separately has a concern about recurrent CVAs in " her family.  Advised to discuss with hematology whether she should to have screening for hypercoagulability or other causes.    We would be happy to see her in follow-up as needed    Visit length 45 minutes, >50% time face to face counseling and coordination of care

## 2025-04-30 NOTE — PATIENT INSTRUCTIONS
It was nice to meet you today     At this time it does not appear that you have an immune deficiency     We recommend that you continue evaluation with hematology/oncology and agree with bone marrow biopsy     We would be happy to see you in follow up as needed    Our nurse line phone number is [phone] if you have questions or concerns

## 2025-04-30 NOTE — LETTER
April 30, 2025     Natalee Murry MD PhD  [id number]  [facility] [address]    Patient: Mable Daniel   YOB: 1965   Date of Visit: 4/30/2025       Dear Dr. Natalee Murry MD PhD:    Thank you for referring Mable Daniel to me for evaluation. Below are my notes for this consultation.  If you have questions, please do not hesitate to call me. I look forward to following your patient along with you.       Sincerely,     Princess PATTIE Au MD      CC: Shana Villarreal, DO  ______________________________________________________________________________________    Mable Daniel presents for initial evaluation today.      Mable Daniel was seen at the request of Natalee Murry MD P* for a chief complaint of concern for immune deficiency; a report with my findings is being sent via written or electronic means to Natalee Murry MD P* with my recommendations for treatment    She provides the following history:  She was referred from Dr. Murry in Hematology/Oncology for evaluation of possible immune deficiency contributing to for mild leukopenia and lymphopenia.  Regarding evaluation, she has had negative autoimmune antibody testing, normal quantitative immunoglobulins, normal infectious disease screening including for hepatitis and HIV, and peripheral blood lymphocyte immunophenotyping which showed low  expression on granulocytes.      In terms of infections, she denies history of recurrent infections including recurrent sinopulmonary infections, fungal infections, skin infections, abscesses, boils, history of sepsis, history of meningitis, history of any severe or recalcitrant infections.  She denies any family history of immune deficiency.     She notes that when she initially saw hematology, she had fatigue and cervical lymphadenopathy that has since resolved.  She also has a separate concern about history of recurrent CVAs in mother and grandmother and whether she has  "risk of CVA in the future.    Rhinitis: Denies     Asthma: Denies     Eczema: Denies eczema however was recently diagnosed with contact dermatitis.  She was seen by Dr. Willie Smith in allergy and had patch testing that was positive to formaldehyde and MCI/MI yesterday.      Food allergy: Denies     Venom allergy:  Denies     Drug allergy: Meperidine causes itching during childbirth       Pertinent Allergy/Immunology family history:  Daughter with UCTD, contact dermatitis to coconut derivate's   Mother had breast cancer and had pulmonary issue, CVA; unsure if had autoimmunity   Denies family history of immune deficiency/inborn errors of immunity      Review of Systems   Constitutional: Negative.    HENT: Negative.     Eyes: Negative.    Respiratory: Negative.     Cardiovascular: Negative.    Gastrointestinal: Negative.    Musculoskeletal: Negative.    Skin: Negative.    Allergic/Immunologic: Negative.    Hematological: Negative.      Vital signs:  /82 (BP Location: Right arm, Patient Position: Sitting)   Pulse 71   Ht 1.613 m (5' 3.5\")   Wt 67.4 kg (148 lb 9.4 oz)   BMI 25.91 kg/m²     Physical Exam:  GENERAL: Alert, oriented and in no acute distress.     HEENT: EYES: No conjunctival injection or cobblestoning. Nose: nasal turbinates mildly edematous and are not boggy.  There is no mucous stranding, polyps, or blood noted. EARS: Tympanic membranes are clear. MOUTH: moist and pink with no exudates, ulcers, or thrush. NECK: No upper airway stridor noted.       HEART: regular rate and rhythm.       LUNGS: Clear to auscultation bilaterally. No wheezing, rhonchi or rales.        ABDOMEN: Positive bowel sounds, soft, nontender, nondistended.       EXTREMITIES: No clubbing or edema.        NEURO:  Normal affect.  Gait normal.      SKIN: No rash, hives, or angioedema noted    Impression:  1. Leukopenia, unspecified type      Assessment and Plan:  We reviewed labs in detail today including qualitative " immunoglobulins, autoimmune screening, infectious disease screening, and peripheral blood immunophenotyping.  She has normal quantitative IgG, IgA, IgM, as well as unremarkable B, T, NK cell populations.  The only abnormality detected was low expression of  on granulocytes.  Based on clinical history and laboratory evaluation, immune deficiency is unlikely.  Would recommend continued evaluation for possible myelodysplastic disorder particularly given low expression of .  She will contact hematology/oncology to determine next steps.    She separately has a concern about recurrent CVAs in her family.  Advised to discuss with hematology whether she should to have screening for hypercoagulability or other causes.    We would be happy to see her in follow-up as needed    Visit length 45 minutes, >50% time face to face counseling and coordination of care

## 2025-06-26 ASSESSMENT — ENCOUNTER SYMPTOMS: ABDOMINAL PAIN: 1

## 2025-06-27 ENCOUNTER — HOSPITAL ENCOUNTER (OUTPATIENT)
Dept: RADIOLOGY | Facility: HOSPITAL | Age: 60
Discharge: HOME | End: 2025-06-27
Payer: COMMERCIAL

## 2025-06-27 ENCOUNTER — OFFICE VISIT (OUTPATIENT)
Facility: CLINIC | Age: 60
End: 2025-06-27
Payer: COMMERCIAL

## 2025-06-27 ENCOUNTER — APPOINTMENT (OUTPATIENT)
Dept: LAB | Facility: HOSPITAL | Age: 60
End: 2025-06-27
Payer: COMMERCIAL

## 2025-06-27 VITALS
DIASTOLIC BLOOD PRESSURE: 62 MMHG | HEART RATE: 65 BPM | RESPIRATION RATE: 16 BRPM | SYSTOLIC BLOOD PRESSURE: 97 MMHG | WEIGHT: 159 LBS | TEMPERATURE: 98.3 F | HEIGHT: 65 IN | OXYGEN SATURATION: 95 % | BODY MASS INDEX: 26.49 KG/M2

## 2025-06-27 DIAGNOSIS — R14.0 ABDOMINAL BLOATING: ICD-10-CM

## 2025-06-27 DIAGNOSIS — Z00.00 ROUTINE MEDICAL EXAM: ICD-10-CM

## 2025-06-27 DIAGNOSIS — Z00.00 ENCOUNTER FOR GENERAL ADULT MEDICAL EXAMINATION WITHOUT ABNORMAL FINDINGS: Primary | ICD-10-CM

## 2025-06-27 DIAGNOSIS — R10.2 PELVIC PAIN: ICD-10-CM

## 2025-06-27 DIAGNOSIS — R10.31 RIGHT LOWER QUADRANT PAIN: ICD-10-CM

## 2025-06-27 DIAGNOSIS — G89.29 CHRONIC RIGHT SI JOINT PAIN: ICD-10-CM

## 2025-06-27 DIAGNOSIS — M99.05 SOMATIC DYSFUNCTION OF PELVIS REGION: ICD-10-CM

## 2025-06-27 DIAGNOSIS — M53.3 CHRONIC RIGHT SI JOINT PAIN: ICD-10-CM

## 2025-06-27 DIAGNOSIS — R10.11 RIGHT UPPER QUADRANT PAIN: ICD-10-CM

## 2025-06-27 DIAGNOSIS — R10.11 RIGHT UPPER QUADRANT PAIN: Primary | ICD-10-CM

## 2025-06-27 PROBLEM — L57.0 SENILE HYPERKERATOSIS: Status: ACTIVE | Noted: 2017-10-30

## 2025-06-27 PROBLEM — N83.202 OVARIAN CYST, LEFT: Status: RESOLVED | Noted: 2024-08-20 | Resolved: 2025-06-27

## 2025-06-27 PROBLEM — L30.0 NUMMULAR ECZEMA: Status: ACTIVE | Noted: 2020-02-21

## 2025-06-27 PROBLEM — M54.50 LOW BACK PAIN WITHOUT SCIATICA: Status: RESOLVED | Noted: 2024-08-20 | Resolved: 2025-06-27

## 2025-06-27 PROBLEM — B07.9 VERRUCA VULGARIS: Status: ACTIVE | Noted: 2017-10-30

## 2025-06-27 LAB
25(OH)D3 SERPL-MCNC: 32 NG/ML (ref 30–100)
ALBUMIN SERPL BCP-MCNC: 4.3 G/DL (ref 3.4–5)
ALP SERPL-CCNC: 69 U/L (ref 33–110)
ALT SERPL W P-5'-P-CCNC: 19 U/L (ref 7–45)
ANION GAP SERPL CALC-SCNC: 12 MMOL/L (ref 10–20)
APPEARANCE UR: CLEAR
AST SERPL W P-5'-P-CCNC: 24 U/L (ref 9–39)
BASOPHILS # BLD AUTO: 0.02 X10*3/UL (ref 0–0.1)
BASOPHILS NFR BLD AUTO: 0.7 %
BILIRUB SERPL-MCNC: 0.4 MG/DL (ref 0–1.2)
BILIRUB UR STRIP.AUTO-MCNC: NEGATIVE MG/DL
BUN SERPL-MCNC: 22 MG/DL (ref 6–23)
CALCIUM SERPL-MCNC: 9.4 MG/DL (ref 8.6–10.6)
CHLORIDE SERPL-SCNC: 103 MMOL/L (ref 98–107)
CHOLEST SERPL-MCNC: 218 MG/DL (ref 0–199)
CHOLESTEROL/HDL RATIO: 2.4
CO2 SERPL-SCNC: 28 MMOL/L (ref 21–32)
COLOR UR: COLORLESS
CREAT SERPL-MCNC: 0.77 MG/DL (ref 0.5–1.05)
CRP SERPL HS-MCNC: 0.7 MG/L
EGFRCR SERPLBLD CKD-EPI 2021: 89 ML/MIN/1.73M*2
EOSINOPHIL # BLD AUTO: 0.02 X10*3/UL (ref 0–0.7)
EOSINOPHIL NFR BLD AUTO: 0.7 %
ERYTHROCYTE [DISTWIDTH] IN BLOOD BY AUTOMATED COUNT: 13.2 % (ref 11.5–14.5)
EST. AVERAGE GLUCOSE BLD GHB EST-MCNC: 108 MG/DL
GLUCOSE SERPL-MCNC: 95 MG/DL (ref 74–99)
GLUCOSE UR STRIP.AUTO-MCNC: NORMAL MG/DL
HBA1C MFR BLD: 5.4 % (ref ?–5.7)
HCT VFR BLD AUTO: 40.5 % (ref 36–46)
HDLC SERPL-MCNC: 90.6 MG/DL
HGB BLD-MCNC: 12.6 G/DL (ref 12–16)
IMM GRANULOCYTES # BLD AUTO: 0 X10*3/UL (ref 0–0.7)
IMM GRANULOCYTES NFR BLD AUTO: 0 % (ref 0–0.9)
KETONES UR STRIP.AUTO-MCNC: NEGATIVE MG/DL
LDLC SERPL CALC-MCNC: 114 MG/DL
LEUKOCYTE ESTERASE UR QL STRIP.AUTO: NEGATIVE
LYMPHOCYTES # BLD AUTO: 0.84 X10*3/UL (ref 1.2–4.8)
LYMPHOCYTES NFR BLD AUTO: 29.2 %
MCH RBC QN AUTO: 27.9 PG (ref 26–34)
MCHC RBC AUTO-ENTMCNC: 31.1 G/DL (ref 32–36)
MCV RBC AUTO: 90 FL (ref 80–100)
MONOCYTES # BLD AUTO: 0.28 X10*3/UL (ref 0.1–1)
MONOCYTES NFR BLD AUTO: 9.7 %
NEUTROPHILS # BLD AUTO: 1.72 X10*3/UL (ref 1.2–7.7)
NEUTROPHILS NFR BLD AUTO: 59.7 %
NITRITE UR QL STRIP.AUTO: NEGATIVE
NON HDL CHOLESTEROL: 127 MG/DL (ref 0–149)
NRBC BLD-RTO: 0 /100 WBCS (ref 0–0)
PH UR STRIP.AUTO: 5.5 [PH]
PLATELET # BLD AUTO: 202 X10*3/UL (ref 150–450)
POTASSIUM SERPL-SCNC: 4.3 MMOL/L (ref 3.5–5.3)
PROT SERPL-MCNC: 6.8 G/DL (ref 6.4–8.2)
PROT UR STRIP.AUTO-MCNC: NEGATIVE MG/DL
RBC # BLD AUTO: 4.51 X10*6/UL (ref 4–5.2)
RBC # UR STRIP.AUTO: NEGATIVE MG/DL
SODIUM SERPL-SCNC: 139 MMOL/L (ref 136–145)
SP GR UR STRIP.AUTO: 1.01
TRIGL SERPL-MCNC: 69 MG/DL (ref 0–149)
TSH SERPL-ACNC: 1.75 MIU/L (ref 0.44–3.98)
UROBILINOGEN UR STRIP.AUTO-MCNC: NORMAL MG/DL
VLDL: 14 MG/DL (ref 0–40)
WBC # BLD AUTO: 2.9 X10*3/UL (ref 4.4–11.3)

## 2025-06-27 PROCEDURE — 82306 VITAMIN D 25 HYDROXY: CPT

## 2025-06-27 PROCEDURE — 84443 ASSAY THYROID STIM HORMONE: CPT

## 2025-06-27 PROCEDURE — 1036F TOBACCO NON-USER: CPT | Performed by: FAMILY MEDICINE

## 2025-06-27 PROCEDURE — 74177 CT ABD & PELVIS W/CONTRAST: CPT | Performed by: RADIOLOGY

## 2025-06-27 PROCEDURE — 3008F BODY MASS INDEX DOCD: CPT | Performed by: FAMILY MEDICINE

## 2025-06-27 PROCEDURE — 81003 URINALYSIS AUTO W/O SCOPE: CPT

## 2025-06-27 PROCEDURE — 99215 OFFICE O/P EST HI 40 MIN: CPT | Performed by: FAMILY MEDICINE

## 2025-06-27 PROCEDURE — 2550000001 HC RX 255 CONTRASTS: Performed by: FAMILY MEDICINE

## 2025-06-27 PROCEDURE — 85025 COMPLETE CBC W/AUTO DIFF WBC: CPT

## 2025-06-27 PROCEDURE — 86141 C-REACTIVE PROTEIN HS: CPT

## 2025-06-27 PROCEDURE — 80061 LIPID PANEL: CPT

## 2025-06-27 PROCEDURE — 83036 HEMOGLOBIN GLYCOSYLATED A1C: CPT

## 2025-06-27 PROCEDURE — 74177 CT ABD & PELVIS W/CONTRAST: CPT

## 2025-06-27 PROCEDURE — 80053 COMPREHEN METABOLIC PANEL: CPT

## 2025-06-27 RX ADMIN — IOHEXOL 75 ML: 350 INJECTION, SOLUTION INTRAVENOUS at 15:36

## 2025-06-27 ASSESSMENT — PATIENT HEALTH QUESTIONNAIRE - PHQ9
SUM OF ALL RESPONSES TO PHQ9 QUESTIONS 1 AND 2: 0
2. FEELING DOWN, DEPRESSED OR HOPELESS: NOT AT ALL
1. LITTLE INTEREST OR PLEASURE IN DOING THINGS: NOT AT ALL

## 2025-06-27 NOTE — PATIENT INSTRUCTIONS
Thank you for allowing me to be a part of you care. As you know this is more of an intimate office style setting and in order to stream line office tasks I strongly encourage use of the [facility] portal for requesting refills, non-urgent appointment requests, or any non-urgent quests or concerns you may have. Calls regarding non-emergent issues during our office hours of Mon-Friday 8:30-4:30 are fine if you are disinclined to use the portal. We do have someone from the office on call at all times but this is only one person and not an answering service so we discourage non-emergent calls outside of business hours.     Cat scan  Labs   PT   Follow up next week

## 2025-06-27 NOTE — PROGRESS NOTES
"  [facility]    [address]  [place] [address]    Phone: [phone]    Progress Note     Subjective   Patient ID: Mable Daniel \"Tevin" is a 60 y.o. female who presents for Follow-up (Discuss on going right side pian for 2 months comes and goes becoming more consistent and more intensity. /Gyno /Has a similar pain years ago 1984- cyst on right ovary /Has seen gyno ordered an US ).    HPI    ACUTE VISIT    Patient presents today for evaluation of right sided pains.    She is here with 2 month on and off history of pain that started in right pelvic/RLQ with cramping sensation and radiates to RUQ. [place] similar to when she had right ovarian cyst that required removal in 1984. Symptoms started suddenly and are gradually becoming more consistent and intense. She notices that symptoms are exacerbated with a full bladder. She recently saw another GYN provider in her lieu of her established GYN at [facility] due to pain. Their note reports they were unable to elicit or reproduce pain on exam, they also performed in office pelvic ultrasound that was unremarkable which patient confirms. Her last pap was collected 6/2/2025 was ASCUS and HPV negative. LMP at age 51, she is now post-menopausal.     She has never had any colonoscopy, her last colon cancer screening was in [facility] which was negative in September 2024. She notes occasional bloating but denies any diarrhea, constipation, or changes in bowel habits. She denies any change in symptoms with stopping or re-starting probiotic recently.    She states that overall she is doing well, continues her work-up routine 5 days per week. She has been able to increase her weights when doing squats, etc. In past she has seen physical therapist in with her chiropractor for her back and did feel this was helpful but did not do any sort of pelvic floor exercises during these sessions. She is not presently in any stretching " "program such as yoga or patito chi. Her last labs were done in September 2024, she has not had any more recent blood work. No fevers, no chills, no swollen lumps/bumps or glands.    Review of Systems  The full, multi-organ review of systems, is within normal limits with the exception of what is noted above in HPI.      RX Allergies[1]    Current Medications[2]    Problem List[3]    Objective   BP 97/62 (BP Location: Right arm, Patient Position: Sitting, BP Cuff Size: Adult)   Pulse 65   Temp 36.8 °C (98.3 °F) (Temporal)   Resp 16   Ht 1.638 m (5' 4.5\")   Wt 72.1 kg (159 lb)   SpO2 95%   BMI 26.87 kg/m²      Physical Exam  Vitals and nursing note reviewed.   Constitutional:       General: She is not in acute distress.     Appearance: Normal appearance. She is not toxic-appearing.   HENT:      Head: Normocephalic and atraumatic.      Mouth/Throat:      Mouth: Mucous membranes are moist.      Pharynx: Oropharynx is clear.   Eyes:      Extraocular Movements: Extraocular movements intact.      Pupils: Pupils are equal, round, and reactive to light.   Neck:      Thyroid: No thyromegaly.      Vascular: No hepatojugular reflux or JVD.   Cardiovascular:      Rate and Rhythm: Normal rate and regular rhythm.      Heart sounds: No murmur heard.     No friction rub. No gallop.   Pulmonary:      Effort: Pulmonary effort is normal.      Breath sounds: Normal breath sounds. No wheezing, rhonchi or rales.   Abdominal:      General: Bowel sounds are normal. There is no distension.      Palpations: Abdomen is soft. There is no mass.      Tenderness: There is abdominal tenderness (Mild to moderately). There is guarding (R side of abdomen). There is no rebound.      Comments: Ttp mild to moderate rlq, and right upper pelvic region , mod ttp  to the right of umbilius, right upper quadrant     No discreet masses        Musculoskeletal:      Right lower leg: No edema.      Left lower leg: No edema.      Comments:   Well-developed " muscles overall.  Tightness to bilateral traps, scalenes, sternocleidomastoids but right greater than left.    Prominent/ tight   lumbar paraspinal muscles right greater than left        Shoulders shifted to the right compared to pelvis ,     ASIS right 2 finger breadths lower than left     SI joints non-tender to palpation        Lymphadenopathy:      Cervical: No cervical adenopathy.   Skin:     General: Skin is warm and dry.      Comments:   4 cm x 3 cm subcutaneous rubbery nodule left thoracolumbar junction T10-T11 consistent with her reported history of lipoma.   Neurological:      General: No focal deficit present.      Mental Status: She is alert and oriented to person, place, and time.   Psychiatric:         Mood and Affect: Mood normal.         Behavior: Behavior normal.         Assessment/Plan   Assessment & Plan  Right upper quadrant pain  Abdominal bloating  Right lower quadrant pain  Pelvic pain    2 month history of on and off right sided pelvic, flank, RLQ, and RUQ pains. Recent GYN evaluation which include pelvic exam and ultrasound was unremarkable. Most recent labs available for review were from September 2024. On exam today there was mild-to-mod tenderness right side of abdomen, minimal fullness but guarding noted. No rebound. No masses palpated.    PLAN:  Update lab work - CBC w/ diff, CMP, lipid, TSH, A1c, HS-CRP, Vit D level, and urinalysis ordered today which are included with  select membership  STAT CT abdomen/pelvis with IV contrast ordered today - labs to be done prior to this imaging.  Physical therapy order placed for somatic dysfunction of pelvic region and chronic R hip pain  Further recommendations pending imaging results.  Short course follow-up with me next week to review studies/results and next steps.      Orders:    Referral to Physical Therapy; Future    CT abdomen pelvis w IV contrast; Future    Routine medical exam  Lab orders only at today's visit. Patient to schedule  follow-up to return for their annual wellness exam and/or lab review.      Orders:    CBC and Auto Differential; Future    Comprehensive Metabolic Panel; Future    Lipid Panel; Future    TSH with reflex to Free T4 if abnormal; Future    C-Reactive Protein, High Sensitivity; Future    Vitamin D 25-Hydroxy,Total (for eval of Vitamin D levels); Future    Hemoglobin A1C; Future    Urinalysis with Reflex Microscopic; Future    Somatic dysfunction of pelvis region    Orders:    Referral to Physical Therapy; Future    Chronic right SI joint pain    Orders:    Referral to Physical Therapy; Future    Due for mammogram (had normal mammo 1 yr ago, due for this year,  will discuss and order at follow up if patient does not have scheduled through her GYN (recently saw her)     Follow-up:  Follow-up 1 hour visit with me next week for recheck above/review studies.  Labs to be done prior.     Call for sooner follow-up if needed.       Time Spent  Prep time on day of patient encounter: 5 minutes  Time spent directly with patient, family or caregiver: 50 minutes  Additional Time Spent on Patient Care Activities: 5 minutes  Documentation Time: 10 minutes  Other Time Spent: 0 minutes  Total: 70 minutes        Attestation:  Scribe Attestation  By signing my name below, Camila CELESTIN, iWlliam   attest that this documentation has been prepared under the direction and in the presence of Shana Villarreal DO.           [1]   Allergies  Allergen Reactions    Meperidine Itching    Formaldehyde Hives, Itching, Rash and Swelling    Methylisothiazolinone Hives, Itching and Rash   [2] No current outpatient medications on file.  [3]   Patient Active Problem List  Diagnosis    Healthcare maintenance    Leukopenia    Low vitamin D level    Vocal cord paralysis, unilateral partial    Nummular eczema    Verruca vulgaris    Senile hyperkeratosis

## 2025-06-30 ENCOUNTER — APPOINTMENT (OUTPATIENT)
Facility: CLINIC | Age: 60
End: 2025-06-30
Payer: COMMERCIAL

## 2025-06-30 VITALS
OXYGEN SATURATION: 96 % | RESPIRATION RATE: 16 BRPM | TEMPERATURE: 97.7 F | HEART RATE: 68 BPM | WEIGHT: 159 LBS | BODY MASS INDEX: 26.49 KG/M2 | SYSTOLIC BLOOD PRESSURE: 100 MMHG | HEIGHT: 65 IN | DIASTOLIC BLOOD PRESSURE: 62 MMHG

## 2025-06-30 DIAGNOSIS — R14.0 ABDOMINAL BLOATING: ICD-10-CM

## 2025-06-30 DIAGNOSIS — R19.5 CHANGE IN STOOL CALIBER: ICD-10-CM

## 2025-06-30 DIAGNOSIS — Z12.31 SCREENING MAMMOGRAM FOR BREAST CANCER: ICD-10-CM

## 2025-06-30 DIAGNOSIS — R10.31 RIGHT LOWER QUADRANT PAIN: ICD-10-CM

## 2025-06-30 DIAGNOSIS — R10.11 RIGHT UPPER QUADRANT PAIN: Primary | ICD-10-CM

## 2025-06-30 DIAGNOSIS — D72.819 LEUKOPENIA, UNSPECIFIED TYPE: ICD-10-CM

## 2025-06-30 DIAGNOSIS — D17.1 LIPOMA OF TORSO: ICD-10-CM

## 2025-06-30 PROBLEM — R79.89 LOW VITAMIN D LEVEL: Status: RESOLVED | Noted: 2024-08-20 | Resolved: 2025-06-30

## 2025-06-30 LAB
HOLD SPECIMEN: NORMAL
HOLD SPECIMEN: NORMAL

## 2025-06-30 PROCEDURE — 99214 OFFICE O/P EST MOD 30 MIN: CPT | Performed by: FAMILY MEDICINE

## 2025-06-30 PROCEDURE — 3008F BODY MASS INDEX DOCD: CPT | Performed by: FAMILY MEDICINE

## 2025-06-30 NOTE — ASSESSMENT & PLAN NOTE
Previously saw [facility] hematology/oncology in fall 2024 for work-up of mild lymphocytopenia. They did not find any evidence of autoimmune disease, infection, immunodeficiency, or lymphoproliferative disorder. They advised could consider bone marrow biopsy to definitively rule out dysplasia as a cause of low  expression on granulocytes, but without cytopenias it is unlikely that intervention would be indicated. [facility] [place] referred patient to immunology in case the lymphocytopenia is somehow related to an immunodeficiency (subclinical) that was not tested for. If no dx is made, would reconsider BMBx.    Patient saw  immunology in [facility] in April 2025 who performed additional work-up including qualitative immunoglobulins, autoimmune screening, infectious disease screening, and peripheral blood immunophenotyping. Per review of their note she has normal quantitative IgG, IgA, IgM, as well as unremarkable B, T, NK cell populations.  The only abnormality detected was low expression of  on granulocytes.  Per immunology, based on clinical history and laboratory evaluation, immune deficiency is unlikely. They recommended continued evaluation for possible myelodysplastic disorder particularly given low expression of .      Recent labs done in pursuit of evaluation of GI symptoms showed persistent mild leukopenia similar to historical trends. However, in abundance of caution I have recommended that patient return to [facility] for further evaluation given report of mild fatigue and new GI symptoms. Additionally I discussed and offered Galleri testing, will place order today at patient request.    Galleri testing was discussed and offered to patient. This is a multi-cancer early detection blood panel test that screens for a signal shared by more than 50 types of cancer using cell-free DNA in the bloodstream. The test is recommended for use in adults with an elevated risk for cancer, such as those aged 50 or  older. It should be used in addition to routine cancer screening tests recommended by a healthcare provider. The test has been shown to have a false positive rate of 0.5% and a sensitivity of 66%, but it is not yet approved by the FDA or reimbursed by Medicare.    Today, doctors test individually for 5 specific cancers -- colorectal, lung (for those at risk), breast, cervical, and prostate.    While these single-cancer screenings play an important role in detecting these 5 cancers, nearly 70% of cancers have no recommended screening tests.    Visit www.[email or web address].com for more detailed information.    AimWith screens for the following cancers, in alphabetic order:    A:  Adrenal Cortical Carcinoma  Ampulla of Vater  Anus  Appendix, Carcinoma    B:  Bile Ducts, Distal  Bile Ducts, Intrahepatic  Bile Ducts, Perihilar  Bladder, Urinary  Bone  Breast    C:  Cervix  Colon and Rectum    E:  Esophagus and Esophagogastric Junction    G:  Gallbladder  Gastrointestinal Stromal Tumor  Gestational Trophoblastic Neoplasms    K:  Kidney    L:  Larynx  Leukemia  Liver  Lung  Lymphoma (Hodgkin and Non-Hodgkin)    M:  Melanoma of the Skin  Merkel Cell Carcinoma  Mesothelioma, Malignant Pleural    N:  Nasal Cavity and Paranasal Sinuses Nasopharynx  Neuroendocrine Tumors of the Appendix  Neuroendocrine Tumors of the Colon and Rectum  Neuroendocrine Tumors of the Pancreas    O:  Oral Cavity  Oropharynx (HPV-Mediated, p16+)  Oropharynx (p16-) and Hypopharynx  Ovary, Fallopian Tube and Primary Peritoneum    P:  Pancreas, exocrine  Penis  Plasma Cell Myeloma and Plasma Cell Disorders  Prostate    S:  Small Intestine  Soft Tissue Sarcoma of the Abdomen and Thoracic Visceral Organs  Soft Tissue Sarcoma of the Head and Neck  Soft Tissue Sarcoma of the Retroperitoneum  Soft Tissue Sarcoma of the Trunk and Extremities  Soft Tissue Sarcoma Unusual Histologies and Sites  Stomach    T:  Testis    U:  Ureter, Renal Pelvis  Uterus, Carcinoma and  Carcinosarcoma  Uterus, Sarcoma    V:  Vagina  Vulva

## 2025-06-30 NOTE — PROGRESS NOTES
"  formerly [facility]    [address]  [place] [address]    Phone: [phone]    Progress Note     Subjective   Patient ID: Mable Daniel \"Rosette\" is a 60 y.o. female who presents for Follow-up (Symptoms and CT results, labs, no changes in symptoms since last seen. ).    HPI    Patient presents today for short course follow-up.    Condition(s) Reviewed at Visit Today, summary from last visit note on 6/27/2025:    Right upper quadrant pain  Abdominal bloating  Right lower quadrant pain  Pelvic pain     2 month history of on and off right sided pelvic, flank, RLQ, and RUQ pains. Recent GYN evaluation which include pelvic exam and ultrasound was unremarkable. Most recent labs available for review were from September 2024. On exam today there was mild-to-mod tenderness right side of abdomen, minimal fullness but guarding noted. No rebound. No masses palpated.     PLAN:  Update lab work - CBC w/ diff, CMP, lipid, TSH, A1c, HS-CRP, Vit D level, and urinalysis ordered today which are included with [facility] membership  STAT CT abdomen/pelvis with IV contrast ordered today - labs to be done prior to this imaging.  Physical therapy order placed for somatic dysfunction of pelvic region and chronic R hip pain  Further recommendations pending imaging results.  Short course follow-up with me next week to review studies/results and next steps.    Studies Reviewed at Visit Today:    6/27/2025 CT A/P: fairly large stool burden, no acute abnormality of the abdomen and pelvis.    6/27/2025 LABS:  CBC with mild leukopenia, stable from historical trends  CMP wnl  TSH wnl  CRP wnl  Hgb A1c 5.4 (nml)  Vit D 32 (lower end normal)  UA wnl  Lipid panel TC/HDL ratio 2.4, HDL 90, , Trig 69    Today patient repots that her symptoms are unchanged from our prior visit together a week ago. She notes that she does have some pains in abdomen that follow the large intestine and tends to occur " "post-prandially, mostly in right side of abdomen at angle of large intestine. No epigastric or reflux type with eating. Additionally, she reports that are stools are slightly thinner in caliber than her baseline. Similar has occurred in past per patient that improved with increasing her dietary fiber intake. Has never used fiber supplement such as Metamucil or Psyllium with any regularity. She has never had a colonoscopy, last colon cancer screening was in September 2024 which was negative Cologuard. She denies any mucus in her stool, hematochezia, diarrhea, or constipation.    Review of Systems  The full, multi-organ review of systems, is within normal limits with the exception of what is noted above in HPI.      RX Allergies[1]    Current Medications[2]    Problem List[3]    Objective   /62 (BP Location: Right arm, Patient Position: Sitting, BP Cuff Size: Adult)   Pulse 68   Temp 36.5 °C (97.7 °F) (Temporal)   Resp 16   Ht 1.638 m (5' 4.5\")   Wt 72.1 kg (159 lb)   SpO2 96%   BMI 26.87 kg/m²      Physical Exam  Vitals and nursing note reviewed.   Constitutional:       General: She is not in acute distress.     Appearance: Normal appearance. She is not toxic-appearing.   HENT:      Head: Normocephalic and atraumatic.      Mouth/Throat:      Mouth: Mucous membranes are moist.      Pharynx: Oropharynx is clear.   Eyes:      Extraocular Movements: Extraocular movements intact.      Pupils: Pupils are equal, round, and reactive to light.   Neck:      Thyroid: No thyromegaly.      Vascular: No hepatojugular reflux or JVD.   Cardiovascular:      Rate and Rhythm: Normal rate and regular rhythm.      Heart sounds: No murmur heard.     No friction rub. No gallop.   Pulmonary:      Effort: Pulmonary effort is normal.      Breath sounds: Normal breath sounds. No wheezing, rhonchi or rales.   Abdominal:      General: Bowel sounds are normal. There is no distension.      Palpations: Abdomen is soft. There is no " mass.      Tenderness: There is abdominal tenderness (Mild to moderately). There is guarding (R side of abdomen). There is no rebound.      Comments: Ttp mild to moderate rlq, and right upper pelvic region , mod ttp  to the right of umbilius, right upper quadrant     No discreet masses        Musculoskeletal:      Right lower leg: No edema.      Left lower leg: No edema.      Comments:        Lymphadenopathy:      Cervical: No cervical adenopathy.   Skin:     General: Skin is warm and dry.   Neurological:      General: No focal deficit present.      Mental Status: She is alert and oriented to person, place, and time.   Psychiatric:         Mood and Affect: Mood normal.         Behavior: Behavior normal.         Assessment/Plan   Assessment & Plan  Right upper quadrant pain  Right lower quadrant pain  Abdominal bloating  Change in stool caliber    PLAN:  Patient reassured, no sinister findings on the CT abdomen/pelvis.  Referral to GI placed today  Consider stopping probiotic altogether to make sure not contributing/minimize variables. Can further discuss with GI.  Recommend she increase soluble fiber intake with report of change in caliber of stool and large stool burden noted on imaging.  Try daily psyllium (This is the soluble fiber found in Metamucil, but can also be purchased in capsule form free of dyes or sweeteners).    Start with a small amount of psyllium ( 1/2 tsp powder, or 2-3 capsules daily). Patient made aware may have some gas/bloating with starting increased fiber intake  Increase weekly as starting with the full package-recommended dose may result in bloating and/or gas.  Reviewed dietary ways to increase fiber intake. Make sure to consume both kinds of fiber, with a goal of 30 - 40 g total daily (lists and number of grams of fiber per serving available on line)    Soluble fiber - nuts, beans, seeds, some veg/fruit - lowers sugars, nourishes good bacteria   Insoluable fiber - found in whole grains  and vegetables- bulks stool and helps food pass through more quickly  TRY LOOKING UP FIBER CONTENT IN FOODS AND RECORD HOW MANY GRAMS OF FIBER DAILY YOU ARE CONSUMING (goal 30-40 grams daily if constipated).  64 oz of water is recommended for consumption daily- this is needed to move fiber /stool through the bowels.       Only supplements certified by GMP/USP in [place] are recommended.  GMP = Good Manufacturing Practice  USP = [place] Pharmacopeia    [employer] link provided for patient. Although patient does not have to purchase from [employer] this will provide them with a list of recommended supplements available for reference for purchase elsewhere.       Orders:    Referral to Gastroenterology; Future    Leukopenia, unspecified type  Previously saw [facility] hematology/oncology in fall 2024 for work-up of mild lymphocytopenia. They did not find any evidence of autoimmune disease, infection, immunodeficiency, or lymphoproliferative disorder. They advised could consider bone marrow biopsy to definitively rule out dysplasia as a cause of low  expression on granulocytes, but without cytopenias it is unlikely that intervention would be indicated. Heme-onc referred patient to immunology in case the lymphocytopenia is somehow related to an immunodeficiency (subclinical) that was not tested for. If no dx is made, would reconsider BMBx.    Patient saw  immunology in [facility] in April 2025 who performed additional work-up including qualitative immunoglobulins, autoimmune screening, infectious disease screening, and peripheral blood immunophenotyping. Per review of their note she has normal quantitative IgG, IgA, IgM, as well as unremarkable B, T, NK cell populations.  The only abnormality detected was low expression of  on granulocytes.  Per immunology, based on clinical history and laboratory evaluation, immune deficiency is unlikely. They recommended continued evaluation for possible myelodysplastic  disorder particularly given low expression of .      Recent labs done in pursuit of evaluation of GI symptoms showed persistent mild leukopenia similar to historical trends. However, in abundance of caution I have recommended that patient return to heme-onc for further evaluation given report of mild fatigue and new GI symptoms. Additionally I discussed and offered Galleri testing, will place order today at patient request.    Galleri testing was discussed and offered to patient. This is a multi-cancer early detection blood panel test that screens for a signal shared by more than 50 types of cancer using cell-free DNA in the bloodstream. The test is recommended for use in adults with an elevated risk for cancer, such as those aged 50 or older. It should be used in addition to routine cancer screening tests recommended by a healthcare provider. The test has been shown to have a false positive rate of 0.5% and a sensitivity of 66%, but it is not yet approved by the FDA or reimbursed by Medicare.    Today, doctors test individually for 5 specific cancers -- colorectal, lung (for those at risk), breast, cervical, and prostate.    While these single-cancer screenings play an important role in detecting these 5 cancers, nearly 70% of cancers have no recommended screening tests.    Visit www.galleri.com for more detailed information.    Galleri screens for the following cancers, in alphabetic order:    A:  Adrenal Cortical Carcinoma  Ampulla of Vater  Anus  Appendix, Carcinoma    B:  Bile Ducts, Distal  Bile Ducts, Intrahepatic  Bile Ducts, Perihilar  Bladder, Urinary  Bone  Breast    C:  Cervix  Colon and Rectum    E:  Esophagus and Esophagogastric Junction    G:  Gallbladder  Gastrointestinal Stromal Tumor  Gestational Trophoblastic Neoplasms    K:  Kidney    L:  Larynx  Leukemia  Liver  Lung  Lymphoma (Hodgkin and Non-Hodgkin)    M:  Melanoma of the Skin  Merkel Cell Carcinoma  Mesothelioma, Malignant  Pleural    N:  Nasal Cavity and Paranasal Sinuses Nasopharynx  Neuroendocrine Tumors of the Appendix  Neuroendocrine Tumors of the Colon and Rectum  Neuroendocrine Tumors of the Pancreas    O:  Oral Cavity  Oropharynx (HPV-Mediated, p16+)  Oropharynx (p16-) and Hypopharynx  Ovary, Fallopian Tube and Primary Peritoneum    P:  Pancreas, exocrine  Penis  Plasma Cell Myeloma and Plasma Cell Disorders  Prostate    S:  Small Intestine  Soft Tissue Sarcoma of the Abdomen and Thoracic Visceral Organs  Soft Tissue Sarcoma of the Head and Neck  Soft Tissue Sarcoma of the Retroperitoneum  Soft Tissue Sarcoma of the Trunk and Extremities  Soft Tissue Sarcoma Unusual Histologies and Sites  Stomach    T:  Testis    U:  Ureter, Renal Pelvis  Uterus, Carcinoma and Carcinosarcoma  Uterus, Sarcoma    V:  Vagina  Vulva          Lipoma of torso  Lipoma of back, defers referral at this time as she will be transitioning to new insurance provider in September. Patient to call or message for referral to general surgery when she has new insurance.         Screening mammogram for breast cancer  Last mammogram in May 2024 negative, due for annual screening. Order placed. Mom with history of breast cancer, advised will need this to be up to date for consideration HRT (would only consider transdermal estrogen given FMHx)    Orders:    BI mammo bilateral screening tomosynthesis; Future        Follow-up:  Follow-up with me in August for recheck above/discuss HRT/weight. Will make this visit on same day as her  select physical.    Message:  Here are the vitamins Dr Villarreal recommends for you.  Supplement recommendations:  Basic Nutrients 2/Day (60 capsules) ([employer]ya): Please take 2 capsules x once per day / anytime ongoing. Take with food (1 cap in am with food and one cap in pm with food).   Psyllium Husk Caps (500 capsules) ([employer]): Please take 3 capsules x twice per day / anytime for 1 month    Call for sooner follow-up if needed.        Time Spent  Prep time on day of patient encounter: 5 minutes  Time spent directly with patient, family or caregiver: 50 minutes  Additional Time Spent on Patient Care Activities: 7 minutes  Documentation Time: 13 minutes  Other Time Spent: 0 minutes  Total: 75 minutes              Attestation:  Scribe Attestation  By signing my name below, FAWAD CamilaWilliam Samaniego   attest that this documentation has been prepared under the direction and in the presence of Shana Villarreal DO.           [1]   Allergies  Allergen Reactions    Meperidine Itching    Formaldehyde Hives, Itching, Rash and Swelling    Methylisothiazolinone Hives, Itching and Rash   [2] No current outpatient medications on file.  [3]   Patient Active Problem List  Diagnosis    Healthcare maintenance    Leukopenia    Low vitamin D level    Vocal cord paralysis, unilateral partial    Nummular eczema    Verruca vulgaris    Senile hyperkeratosis    Lipoma of torso    Right upper quadrant pain

## 2025-06-30 NOTE — ASSESSMENT & PLAN NOTE
PLAN:  Patient reassured, no sinister findings on the CT abdomen/pelvis.  Referral to GI placed today  Consider stopping probiotic altogether to make sure not contributing/minimize variables. Can further discuss with GI.  Recommend she increase soluble fiber intake with report of change in caliber of stool and large stool burden noted on imaging.  Try daily psyllium (This is the soluble fiber found in Metamucil, but can also be purchased in capsule form free of dyes or sweeteners).    Start with a small amount of psyllium ( 1/2 tsp powder, or 2-3 capsules daily). Patient made aware may have some gas/bloating with starting increased fiber intake  Increase weekly as starting with the full package-recommended dose may result in bloating and/or gas.  Reviewed dietary ways to increase fiber intake. Make sure to consume both kinds of fiber, with a goal of 30 - 40 g total daily (lists and number of grams of fiber per serving available on line)    Soluble fiber - nuts, beans, seeds, some veg/fruit - lowers sugars, nourishes good bacteria   Insoluable fiber - found in whole grains and vegetables- bulks stool and helps food pass through more quickly  TRY LOOKING UP FIBER CONTENT IN FOODS AND RECORD HOW MANY GRAMS OF FIBER DAILY YOU ARE CONSUMING (goal 30-40 grams daily if constipated).  64 oz of water is recommended for consumption daily- this is needed to move fiber /stool through the bowels.       Only supplements certified by GMP/USP in [place] are recommended.  GMP = Good Manufacturing Practice  USP = [place] Pharmacopeia    [employer] link provided for patient. Although patient does not have to purchase from [employer] this will provide them with a list of recommended supplements available for reference for purchase elsewhere.       Orders:    Referral to Gastroenterology; Future

## 2025-06-30 NOTE — PATIENT INSTRUCTIONS
1)   Abdominal pain  Psyllium husk supplement -  ( I use Now brand)  for fiber - start with 1 cap daily  GI referral placed -  call for appointment to be seen  Stop probiotic for now    2)   Call or message for surgery referral after your insurance changes for the lipoma     3)  Galleri genetic test ordered - takes 2 weeks     4)  Physical therapy -    referral placed for  [place] ([place] or [place])  low back/ pelvic pain    5)  Mammogram order placed -   can schedule at any time (due anytime after May 2025)     6)  Low white count - do make follow up with your hematologist to discuss next steps (bone marrow biopsy?)     Follow up with me in Aug or Sept   Physical ,   discussion of hormone replacement therapy, GI discomfort, weight       Thank you for allowing me to be a part of you care. As you know this is more of an intimate office style setting and in order to stream line office tasks I strongly encourage use of the [employer] portal for requesting refills, non-urgent appointment requests, or any non-urgent quests or concerns you may have. Calls regarding non-emergent issues during our office hours of Mon-Friday 8:30-4:30 are fine if you are disinclined to use the portal. We do have someone from the office on call at all times but this is only one person and not an answering service so we discourage non-emergent calls outside of business hours.     Leukopenia  Previously saw [facility] hematology/oncology in fall 2024 for work-up of mild lymphocytopenia. They did not find any evidence of autoimmune disease, infection, immunodeficiency, or lymphoproliferative disorder. They advised could consider bone marrow biopsy to definitively rule out dysplasia as a cause of low  expression on granulocytes, but without cytopenias it is unlikely that intervention would be indicated. Heme-onc referred patient to immunology in case the lymphocytopenia is somehow related to an immunodeficiency (subclinical) that was not  tested for. If no dx is made, would reconsider BMBx.    Patient saw  immunology in [facility] in April 2025 who performed additional work-up including qualitative immunoglobulins, autoimmune screening, infectious disease screening, and peripheral blood immunophenotyping. Per review of their note she has normal quantitative IgG, IgA, IgM, as well as unremarkable B, T, NK cell populations.  The only abnormality detected was low expression of  on granulocytes.  Per immunology, based on clinical history and laboratory evaluation, immune deficiency is unlikely. They recommended continued evaluation for possible myelodysplastic disorder particularly given low expression of .      Recent labs done in pursuit of evaluation of GI symptoms showed persistent mild leukopenia similar to historical trends. However, in abundance of caution I have recommended that patient return to heme-onc for further evaluation given report of mild fatigue and new GI symptoms. Additionally I discussed and offered Galleri testing, will place order today at patient request.    Galleri testing was discussed and offered to patient. This is a multi-cancer early detection blood panel test that screens for a signal shared by more than 50 types of cancer using cell-free DNA in the bloodstream. The test is recommended for use in adults with an elevated risk for cancer, such as those aged 50 or older. It should be used in addition to routine cancer screening tests recommended by a healthcare provider. The test has been shown to have a false positive rate of 0.5% and a sensitivity of 66%, but it is not yet approved by the FDA or reimbursed by Medicare.    Today, doctors test individually for 5 specific cancers -- colorectal, lung (for those at risk), breast, cervical, and prostate.    While these single-cancer screenings play an important role in detecting these 5 cancers, nearly 70% of cancers have no recommended screening tests.    Visit  [email or web address].com for more detailed information.    [facility] screens for the following cancers, in alphabetic order:    A:  Adrenal Cortical Carcinoma  Ampulla of Vater  Anus  Appendix, Carcinoma    B:  Bile Ducts, Distal  Bile Ducts, Intrahepatic  Bile Ducts, Perihilar  Bladder, Urinary  Bone  Breast    C:  Cervix  Colon and Rectum    E:  Esophagus and Esophagogastric Junction    G:  Gallbladder  Gastrointestinal Stromal Tumor  Gestational Trophoblastic Neoplasms    K:  Kidney    L:  Larynx  Leukemia  Liver  Lung  Lymphoma (Hodgkin and Non-Hodgkin)    M:  Melanoma of the Skin  Merkel Cell Carcinoma  Mesothelioma, Malignant Pleural    N:  Nasal Cavity and Paranasal Sinuses Nasopharynx  Neuroendocrine Tumors of the Appendix  Neuroendocrine Tumors of the Colon and Rectum  Neuroendocrine Tumors of the Pancreas    O:  Oral Cavity  Oropharynx (HPV-Mediated, p16+)  Oropharynx (p16-) and Hypopharynx  Ovary, Fallopian Tube and Primary Peritoneum    P:  Pancreas, exocrine  Penis  Plasma Cell Myeloma and Plasma Cell Disorders  Prostate    S:  Small Intestine  Soft Tissue Sarcoma of the Abdomen and Thoracic Visceral Organs  Soft Tissue Sarcoma of the Head and Neck  Soft Tissue Sarcoma of the Retroperitoneum  Soft Tissue Sarcoma of the Trunk and Extremities  Soft Tissue Sarcoma Unusual Histologies and Sites  Stomach    T:  Testis    U:  Ureter, Renal Pelvis  Uterus, Carcinoma and Carcinosarcoma  Uterus, Sarcoma    V:  Vagina  Vulva            Lipoma of torso  Lipoma of back, defers referral at this time as she will be transitioning to new insurance provider in September. Patient to call or message for referral to general surgery when she has new insurance.         Right upper quadrant pain    PLAN:  Patient reassured, no sinister findings on the CT abdomen/pelvis.  Referral to GI placed today  Consider stopping probiotic altogether to make sure not contributing/minimize variables. Can further discuss with GI.  Recommend she  increase soluble fiber intake with report of change in caliber of stool and large stool burden noted on imaging.  Try daily psyllium (This is the soluble fiber found in Metamucil, but can also be purchased in capsule form free of dyes or sweeteners).    Start with a small amount of psyllium ( 1/2 tsp powder, or 2-3 capsules daily). Patient made aware may have some gas/bloating with starting increased fiber intake  Increase weekly as starting with the full package-recommended dose may result in bloating and/or gas.  Reviewed dietary ways to increase fiber intake. Make sure to consume both kinds of fiber, with a goal of 30 - 40 g total daily (lists and number of grams of fiber per serving available on line)    Soluble fiber - nuts, beans, seeds, some veg/fruit - lowers sugars, nourishes good bacteria   Insoluable fiber - found in whole grains and vegetables- bulks stool and helps food pass through more quickly  TRY LOOKING UP FIBER CONTENT IN FOODS AND RECORD HOW MANY GRAMS OF FIBER DAILY YOU ARE CONSUMING (goal 30-40 grams daily if constipated).  64 oz of water is recommended for consumption daily- this is needed to move fiber /stool through the bowels.       Orders:    Referral to Gastroenterology; Future

## 2025-06-30 NOTE — ASSESSMENT & PLAN NOTE
Lipoma of back, defers referral at this time as she will be transitioning to new insurance provider in September. Patient to call or message for referral to general surgery when she has new insurance.

## 2025-07-14 LAB
Lab: NORMAL
Lab: NORMAL

## 2025-08-27 ENCOUNTER — APPOINTMENT (OUTPATIENT)
Facility: CLINIC | Age: 60
End: 2025-08-27
Payer: COMMERCIAL

## 2025-09-03 ENCOUNTER — EVALUATION (OUTPATIENT)
Dept: PHYSICAL THERAPY | Facility: CLINIC | Age: 60
End: 2025-09-03
Payer: COMMERCIAL

## 2025-09-03 DIAGNOSIS — M21.70 LEG LENGTH DISCREPANCY: ICD-10-CM

## 2025-09-03 DIAGNOSIS — R29.898 ROTATION OF PELVIS: Primary | ICD-10-CM

## 2025-09-03 PROCEDURE — 97161 PT EVAL LOW COMPLEX 20 MIN: CPT | Mod: GP | Performed by: PHYSICAL THERAPIST

## 2025-09-03 PROCEDURE — 97110 THERAPEUTIC EXERCISES: CPT | Mod: GP | Performed by: PHYSICAL THERAPIST

## 2025-09-18 ENCOUNTER — APPOINTMENT (OUTPATIENT)
Facility: CLINIC | Age: 60
End: 2025-09-18
Payer: COMMERCIAL